# Patient Record
Sex: MALE | Race: WHITE | NOT HISPANIC OR LATINO | URBAN - METROPOLITAN AREA
[De-identification: names, ages, dates, MRNs, and addresses within clinical notes are randomized per-mention and may not be internally consistent; named-entity substitution may affect disease eponyms.]

---

## 2019-10-01 ENCOUNTER — OUTPATIENT (OUTPATIENT)
Dept: OUTPATIENT SERVICES | Facility: HOSPITAL | Age: 44
LOS: 1 days | End: 2019-10-01
Payer: MEDICAID

## 2019-10-01 PROCEDURE — G9001: CPT

## 2019-10-17 DIAGNOSIS — Z71.89 OTHER SPECIFIED COUNSELING: ICD-10-CM

## 2020-06-16 ENCOUNTER — EMERGENCY (EMERGENCY)
Facility: HOSPITAL | Age: 45
LOS: 1 days | End: 2020-06-16
Admitting: EMERGENCY MEDICINE
Payer: MEDICAID

## 2020-06-16 PROCEDURE — 99284 EMERGENCY DEPT VISIT MOD MDM: CPT

## 2020-06-16 PROCEDURE — 71045 X-RAY EXAM CHEST 1 VIEW: CPT | Mod: 26

## 2021-07-26 PROBLEM — Z00.00 ENCOUNTER FOR PREVENTIVE HEALTH EXAMINATION: Status: ACTIVE | Noted: 2021-07-26

## 2023-12-13 ENCOUNTER — INPATIENT (INPATIENT)
Facility: HOSPITAL | Age: 48
LOS: 5 days | Discharge: HOME | DRG: 881 | End: 2023-12-19
Attending: PSYCHIATRY & NEUROLOGY | Admitting: PSYCHIATRY & NEUROLOGY
Payer: MEDICAID

## 2023-12-13 RX ORDER — TRAZODONE HCL 50 MG
50 TABLET ORAL AT BEDTIME
Refills: 0 | Status: DISCONTINUED | OUTPATIENT
Start: 2023-12-13 | End: 2023-12-19

## 2023-12-13 RX ORDER — BUPROPION HYDROCHLORIDE 150 MG/1
150 TABLET, EXTENDED RELEASE ORAL DAILY
Refills: 0 | Status: DISCONTINUED | OUTPATIENT
Start: 2023-12-13 | End: 2023-12-19

## 2023-12-13 RX ORDER — INFLUENZA VIRUS VACCINE 15; 15; 15; 15 UG/.5ML; UG/.5ML; UG/.5ML; UG/.5ML
0.5 SUSPENSION INTRAMUSCULAR ONCE
Refills: 0 | Status: DISCONTINUED | OUTPATIENT
Start: 2023-12-13 | End: 2023-12-13

## 2023-12-13 RX ORDER — ALBUTEROL 90 UG/1
2 AEROSOL, METERED ORAL EVERY 6 HOURS
Refills: 0 | Status: DISCONTINUED | OUTPATIENT
Start: 2023-12-13 | End: 2023-12-19

## 2023-12-13 NOTE — BH PATIENT PROFILE - NSSBIRTDRGBRIEFINTDET_GEN_A_CORE
Using substances at the unhealthy identified increases the risk of substance abuse related health problems.

## 2023-12-13 NOTE — CHART NOTE - NSCHARTNOTEFT_GEN_A_CORE
Acceptance note    Pt arrived as transfer; writer met with patient and collected the following information    48 year old, past psychiatric history of depression & anxiety, denies prior history of inpatient psychiatric hospitalizations or suicide attempts, frequent crack/cocaine use, PMH of abdominal hernia & asthma, presenting as transfer form OSH for approximately one week of SI without plan & depressed mood in context of family- & holiday- related stressors & noncompliance with wellbutrin. Denies any somatic or physical complaints / discomfort currently. Denies any plan to harm self while in the unit, and states that he feels comfortable approaching staff if he develops dangerous thoughts. Denies past or present AVH; screens negative for past or present delusional content. Per RN staff patient has been calm, cooperative, and pleasant on 8UR. States that he was taking wellbutrin  mg po qday for 2-4 months, prescribed by staff at the shelter where he stays, and has been lost to follow-up noncompliant with wellbutrin for the past three days. States that wellbutrin was efficacious & denies any other psychotropic trials. Denies history of alcohol use or seizures. Denies taking any non-psychiatric medications besides intermittent & infrequent PRN albuterol. Pt has no further questions or concerns. Denies wanting something for sleep.     -Admission orders placed   -Wellbutrin  mg po qday   -no 1:1 required Acceptance note    Pt arrived as transfer; writer met with patient and collected the following information    48 year old, past psychiatric history of depression & anxiety, denies prior history of inpatient psychiatric hospitalizations or suicide attempts, frequent crack/cocaine use, PMH of abdominal hernia & asthma, presenting as transfer form OSH for approximately one week of SI without plan & depressed mood in context of family- & holiday- related stressors & noncompliance with wellbutrin. Denies any somatic or physical complaints / discomfort currently. Denies any plan to harm self while in the unit, and states that he feels comfortable approaching staff if he develops dangerous thoughts. Denies past or present AVH; screens negative for past or present delusional content. Per RN staff patient has been calm, cooperative, and pleasant on 8UR. States that he was taking wellbutrin  mg po qday for 2-4 months, prescribed by staff at the shelter where he stays, and has been lost to follow-up noncompliant with wellbutrin for the past three days. States that wellbutrin was efficacious & denies any other psychotropic trials. Denies history of alcohol use or seizures. Denies taking any non-psychiatric medications besides intermittent & infrequent PRN albuterol. Pt has no further questions or concerns. Denies wanting something for sleep.     -Admission orders placed   -Wellbutrin  mg po qday - primary team can alter if indicated   -Albuterol PRN   -Admission labs ordered   -no 1:1 required as pt has no high-risk history and has not exhibited any high-risk behavior

## 2023-12-14 VITALS
HEART RATE: 69 BPM | DIASTOLIC BLOOD PRESSURE: 71 MMHG | RESPIRATION RATE: 18 BRPM | TEMPERATURE: 99 F | OXYGEN SATURATION: 96 % | SYSTOLIC BLOOD PRESSURE: 113 MMHG

## 2023-12-14 DIAGNOSIS — F19.90 OTHER PSYCHOACTIVE SUBSTANCE USE, UNSPECIFIED, UNCOMPLICATED: ICD-10-CM

## 2023-12-14 DIAGNOSIS — F41.9 ANXIETY DISORDER, UNSPECIFIED: ICD-10-CM

## 2023-12-14 LAB
A1C WITH ESTIMATED AVERAGE GLUCOSE RESULT: 5.3 % — SIGNIFICANT CHANGE UP (ref 4–5.6)
A1C WITH ESTIMATED AVERAGE GLUCOSE RESULT: 5.3 % — SIGNIFICANT CHANGE UP (ref 4–5.6)
ALBUMIN SERPL ELPH-MCNC: 3.6 G/DL — SIGNIFICANT CHANGE UP (ref 3.3–5)
ALBUMIN SERPL ELPH-MCNC: 3.6 G/DL — SIGNIFICANT CHANGE UP (ref 3.3–5)
ALP SERPL-CCNC: 66 U/L — SIGNIFICANT CHANGE UP (ref 40–120)
ALP SERPL-CCNC: 66 U/L — SIGNIFICANT CHANGE UP (ref 40–120)
ALT FLD-CCNC: 9 U/L — LOW (ref 10–45)
ALT FLD-CCNC: 9 U/L — LOW (ref 10–45)
ANION GAP SERPL CALC-SCNC: 8 MMOL/L — SIGNIFICANT CHANGE UP (ref 5–17)
ANION GAP SERPL CALC-SCNC: 8 MMOL/L — SIGNIFICANT CHANGE UP (ref 5–17)
AST SERPL-CCNC: 12 U/L — SIGNIFICANT CHANGE UP (ref 10–40)
AST SERPL-CCNC: 12 U/L — SIGNIFICANT CHANGE UP (ref 10–40)
BILIRUB SERPL-MCNC: 0.3 MG/DL — SIGNIFICANT CHANGE UP (ref 0.2–1.2)
BILIRUB SERPL-MCNC: 0.3 MG/DL — SIGNIFICANT CHANGE UP (ref 0.2–1.2)
BUN SERPL-MCNC: 17 MG/DL — SIGNIFICANT CHANGE UP (ref 7–23)
BUN SERPL-MCNC: 17 MG/DL — SIGNIFICANT CHANGE UP (ref 7–23)
CALCIUM SERPL-MCNC: 9 MG/DL — SIGNIFICANT CHANGE UP (ref 8.4–10.5)
CALCIUM SERPL-MCNC: 9 MG/DL — SIGNIFICANT CHANGE UP (ref 8.4–10.5)
CHLORIDE SERPL-SCNC: 105 MMOL/L — SIGNIFICANT CHANGE UP (ref 96–108)
CHLORIDE SERPL-SCNC: 105 MMOL/L — SIGNIFICANT CHANGE UP (ref 96–108)
CHOLEST SERPL-MCNC: 122 MG/DL — SIGNIFICANT CHANGE UP
CHOLEST SERPL-MCNC: 122 MG/DL — SIGNIFICANT CHANGE UP
CO2 SERPL-SCNC: 24 MMOL/L — SIGNIFICANT CHANGE UP (ref 22–31)
CO2 SERPL-SCNC: 24 MMOL/L — SIGNIFICANT CHANGE UP (ref 22–31)
CREAT SERPL-MCNC: 0.7 MG/DL — SIGNIFICANT CHANGE UP (ref 0.5–1.3)
CREAT SERPL-MCNC: 0.7 MG/DL — SIGNIFICANT CHANGE UP (ref 0.5–1.3)
EGFR: 112 ML/MIN/1.73M2 — SIGNIFICANT CHANGE UP
EGFR: 112 ML/MIN/1.73M2 — SIGNIFICANT CHANGE UP
ESTIMATED AVERAGE GLUCOSE: 105 MG/DL — SIGNIFICANT CHANGE UP (ref 68–114)
ESTIMATED AVERAGE GLUCOSE: 105 MG/DL — SIGNIFICANT CHANGE UP (ref 68–114)
GLUCOSE SERPL-MCNC: 74 MG/DL — SIGNIFICANT CHANGE UP (ref 70–99)
GLUCOSE SERPL-MCNC: 74 MG/DL — SIGNIFICANT CHANGE UP (ref 70–99)
HCT VFR BLD CALC: 47.1 % — SIGNIFICANT CHANGE UP (ref 39–50)
HCT VFR BLD CALC: 47.1 % — SIGNIFICANT CHANGE UP (ref 39–50)
HDLC SERPL-MCNC: 38 MG/DL — LOW
HDLC SERPL-MCNC: 38 MG/DL — LOW
HGB BLD-MCNC: 15.7 G/DL — SIGNIFICANT CHANGE UP (ref 13–17)
HGB BLD-MCNC: 15.7 G/DL — SIGNIFICANT CHANGE UP (ref 13–17)
LIPID PNL WITH DIRECT LDL SERPL: 71 MG/DL — SIGNIFICANT CHANGE UP
LIPID PNL WITH DIRECT LDL SERPL: 71 MG/DL — SIGNIFICANT CHANGE UP
MCHC RBC-ENTMCNC: 31.5 PG — SIGNIFICANT CHANGE UP (ref 27–34)
MCHC RBC-ENTMCNC: 31.5 PG — SIGNIFICANT CHANGE UP (ref 27–34)
MCHC RBC-ENTMCNC: 33.3 GM/DL — SIGNIFICANT CHANGE UP (ref 32–36)
MCHC RBC-ENTMCNC: 33.3 GM/DL — SIGNIFICANT CHANGE UP (ref 32–36)
MCV RBC AUTO: 94.4 FL — SIGNIFICANT CHANGE UP (ref 80–100)
MCV RBC AUTO: 94.4 FL — SIGNIFICANT CHANGE UP (ref 80–100)
NON HDL CHOLESTEROL: 84 MG/DL — SIGNIFICANT CHANGE UP
NON HDL CHOLESTEROL: 84 MG/DL — SIGNIFICANT CHANGE UP
NRBC # BLD: 0 /100 WBCS — SIGNIFICANT CHANGE UP (ref 0–0)
NRBC # BLD: 0 /100 WBCS — SIGNIFICANT CHANGE UP (ref 0–0)
PLATELET # BLD AUTO: 278 K/UL — SIGNIFICANT CHANGE UP (ref 150–400)
PLATELET # BLD AUTO: 278 K/UL — SIGNIFICANT CHANGE UP (ref 150–400)
POTASSIUM SERPL-MCNC: 4.6 MMOL/L — SIGNIFICANT CHANGE UP (ref 3.5–5.3)
POTASSIUM SERPL-MCNC: 4.6 MMOL/L — SIGNIFICANT CHANGE UP (ref 3.5–5.3)
POTASSIUM SERPL-SCNC: 4.6 MMOL/L — SIGNIFICANT CHANGE UP (ref 3.5–5.3)
POTASSIUM SERPL-SCNC: 4.6 MMOL/L — SIGNIFICANT CHANGE UP (ref 3.5–5.3)
PROT SERPL-MCNC: 6.6 G/DL — SIGNIFICANT CHANGE UP (ref 6–8.3)
PROT SERPL-MCNC: 6.6 G/DL — SIGNIFICANT CHANGE UP (ref 6–8.3)
RBC # BLD: 4.99 M/UL — SIGNIFICANT CHANGE UP (ref 4.2–5.8)
RBC # BLD: 4.99 M/UL — SIGNIFICANT CHANGE UP (ref 4.2–5.8)
RBC # FLD: 12.4 % — SIGNIFICANT CHANGE UP (ref 10.3–14.5)
RBC # FLD: 12.4 % — SIGNIFICANT CHANGE UP (ref 10.3–14.5)
SODIUM SERPL-SCNC: 137 MMOL/L — SIGNIFICANT CHANGE UP (ref 135–145)
SODIUM SERPL-SCNC: 137 MMOL/L — SIGNIFICANT CHANGE UP (ref 135–145)
TRIGL SERPL-MCNC: 67 MG/DL — SIGNIFICANT CHANGE UP
TRIGL SERPL-MCNC: 67 MG/DL — SIGNIFICANT CHANGE UP
TSH SERPL-MCNC: 1.79 UIU/ML — SIGNIFICANT CHANGE UP (ref 0.27–4.2)
TSH SERPL-MCNC: 1.79 UIU/ML — SIGNIFICANT CHANGE UP (ref 0.27–4.2)
WBC # BLD: 10.68 K/UL — HIGH (ref 3.8–10.5)
WBC # BLD: 10.68 K/UL — HIGH (ref 3.8–10.5)
WBC # FLD AUTO: 10.68 K/UL — HIGH (ref 3.8–10.5)
WBC # FLD AUTO: 10.68 K/UL — HIGH (ref 3.8–10.5)

## 2023-12-14 PROCEDURE — 99223 1ST HOSP IP/OBS HIGH 75: CPT

## 2023-12-14 RX ORDER — NICOTINE POLACRILEX 2 MG
2 GUM BUCCAL
Refills: 0 | Status: DISCONTINUED | OUTPATIENT
Start: 2023-12-14 | End: 2023-12-19

## 2023-12-14 RX ORDER — NICOTINE POLACRILEX 2 MG
1 GUM BUCCAL DAILY
Refills: 0 | Status: DISCONTINUED | OUTPATIENT
Start: 2023-12-14 | End: 2023-12-19

## 2023-12-14 RX ADMIN — Medication 1 PATCH: at 18:00

## 2023-12-14 RX ADMIN — Medication 1 PATCH: at 12:45

## 2023-12-14 RX ADMIN — BUPROPION HYDROCHLORIDE 150 MILLIGRAM(S): 150 TABLET, EXTENDED RELEASE ORAL at 10:21

## 2023-12-14 NOTE — BH PSYCHOLOGY - CLINICIAN PSYCHOTHERAPY NOTE - NSBHPSYCHOLNARRATIVE_PSY_A_CORE FT
APPEARANCE:  [x] adequately groomed []disheveled  [] malodorous [] Other:   BEHAVIOR: xcooperative [] uncooperative [x] good EC [] poor EC x] well related [] oddly related [] guarded []PMA [] PMR []abnormal movements [] Other:   SPEED: [x] normal rate/rhythm/volume [] loud [] quiet [] slow  [] rapid [] pressured [] Other: _________   MOOD: [] euthymic [x] dysphoric []anxious [] irritable [] Other: ___________   AFFECT: [x] full [] expansive [] constricted [] blunted [] flat [] stable [] labile [] Other: _________________ THOUGHT PROCESS: [] organized [] disorganized [] goal-directed [] concrete [] logical  [] illogical   [] circumstantial [] tangential [] impoverished [] effusive [] repetitive [] Other: ___________   THOUGHT CONTENT: [x] negative for delusions/suicidal ideation /homicidal ideation  [] positive for delusions/suicidal ideation/homicidal ideation Describe:   PERCEPTION: [x] negative for auditory/ visual hallucinations  [] positive for auditory/ visual hallucinations Describe:    INSIGHT/JUDGMENT: [x] good []fair [] poor        IMPULSE CONTROL: [x] good []fair [] poor         COGNITION: [x] alert and oriented to person,time,place    Lacks orientation to person/ time/ place. Describe: _______________________    Met with Mr. Bradford for suicide assessment utilizing CAMS. He reported that he is feeling depressed and have been feeling increased depression with suicidal thoughts with some vague plan to jump in front of a bus or train, "something quick", sfor th3e last 5 days, since he did not refill his Wellbutrin, which he believes works well and maintains his mood when he is taking regularly. Mr. Bradford reported that the one time he had a suicide attempt, which he referred to as more of a cry for help was 15 yrs ago by taking pills, and he reported that since then he never attempted suicide, and always seeks help in a hospital, when he notices that his suicidal thoughts are escalating. He reported that he ahs been doing well, after getting discharged from [facility] for Substance use 3 months ago, and following the  with his sponsor, but more recently he relapsed on cocaine, and has been feeling more depressed, and looking for safety, as he wants to continue working on his substance use.   In terms of CAMS, he reported that primary  of suicide is self hate (rated 3/5) and about "I am 48 and I am in a psych spicer", 2nd  is hopelessness and 4/5 and about "getting back my life together, such as getting treatment for the hernia he has been dealing with for the last 5 years and also going to  meetigns", psychological pain is 4/5 and about "being away from my family", son, ex girlfriend, mom, brother sister. He elaborated that his 8yo son lives with his mother in [place], and when Mr. Bradford is not doing well, he is distant from his son, and his family is also keeping their distance from him when he is using cocaine. $th  is stress (4/5 ) and about "not being comfortable, not being with family and not working". 5th  is  agitation  rated 4/5 and about " if I were to keep on being homeless and be on stress, if I lost all connection to my family. He reported that his overall risk of suicide right now is 1/5. He does not feel suicidal, as soon as he started to have thoughts he came to the hospital, which he reported is something he does each time he starts to have suicidal thoughts, and has no intent to attempt suicide.   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 Meanwhile his only reason for dying in " When I fail these[ meaning the reasons for living], I get agitated, and I want to kill myself". He still reported that he is motivated for treatment.   Wish to live is 6/8, and wish to die is 4/8, reporting that sometimes he feels hopeless. One thing that would help him no longer feel suicidal is "having a safe environment to stay in right now" .   and this writer also discussed treatment plan, and he reported that cocaine use and relapse and safety through living in a safe place to be two problems to address,and for his, he needs to stay away from substances but continuing to go to NA, using his wellbutrin.   Mr. Bradford denies any suicidal ideation, is reflective and motivated for treatment, Low Acute Risk for suicide.  Suicide Assessment:   Static:  APPEARANCE:  [x] adequately groomed []disheveled  [] malodorous [] Other:   BEHAVIOR: xcooperative [] uncooperative [x] good EC [] poor EC x] well related [] oddly related [] guarded []PMA [] PMR []abnormal movements [] Other:   SPEED: [x] normal rate/rhythm/volume [] loud [] quiet [] slow  [] rapid [] pressured [] Other: _________   MOOD: [] euthymic [x] dysphoric []anxious [] irritable [] Other: ___________   AFFECT: [x] full [] expansive [] constricted [] blunted [] flat [] stable [] labile [] Other: _________________ THOUGHT PROCESS: [] organized [] disorganized [] goal-directed [] concrete [] logical  [] illogical   [] circumstantial [] tangential [] impoverished [] effusive [] repetitive [] Other: ___________   THOUGHT CONTENT: [x] negative for delusions/suicidal ideation /homicidal ideation  [] positive for delusions/suicidal ideation/homicidal ideation Describe:   PERCEPTION: [x] negative for auditory/ visual hallucinations  [] positive for auditory/ visual hallucinations Describe:    INSIGHT/JUDGMENT: [x] good []fair [] poor        IMPULSE CONTROL: [x] good []fair [] poor         COGNITION: [x] alert and oriented to person,time,place    Lacks orientation to person/ time/ place. Describe: _______________________    Met with Mr. Bradford for suicide assessment utilizing CAMS. He reported that he is feeling depressed and have been feeling increased depression with suicidal thoughts with some vague plan to jump in front of a bus or train, "something quick", sfor th3e last 5 days, since he did not refill his Wellbutrin, which he believes works well and maintains his mood when he is taking regularly. Mr. Bradford reported that the one time he had a suicide attempt, which he referred to as more of a cry for help was 15 yrs ago by taking pills, and he reported that since then he never attempted suicide, and always seeks help in a hospital, when he notices that his suicidal thoughts are escalating. He reported that he ahs been doing well, after getting discharged from [facility] for Substance use 3 months ago, and following the  with his sponsor, but more recently he relapsed on cocaine, and has been feeling more depressed, and looking for safety, as he wants to continue working on his substance use.   In terms of CAMS, he reported that primary  of suicide is self hate (rated 3/5) and about "I am 48 and I am in a psych spicer", 2nd  is hopelessness and 4/5 and about "getting back my life together, such as getting treatment for the hernia he has been dealing with for the last 5 years and also going to  meetigns", psychological pain is 4/5 and about "being away from my family", son, ex girlfriend, mom, brother sister. He elaborated that his 6yo son lives with his mother in [place], and when Mr. Bradford is not doing well, he is distant from his son, and his family is also keeping their distance from him when he is using cocaine. $th  is stress (4/5 ) and about "not being comfortable, not being with family and not working". 5th  is  agitation  rated 4/5 and about " if I were to keep on being homeless and be on stress, if I lost all connection to my family. He reported that his overall risk of suicide right now is 1/5. He does not feel suicidal, as soon as he started to have thoughts he came to the hospital, which he reported is something he does each time he starts to have suicidal thoughts, and has no intent to attempt suicide.   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 Meanwhile his only reason for dying in " When I fail these[ meaning the reasons for living], I get agitated, and I want to kill myself". He still reported that he is motivated for treatment.   Wish to live is 6/8, and wish to die is 4/8, reporting that sometimes he feels hopeless. One thing that would help him no longer feel suicidal is "having a safe environment to stay in right now" .   and this writer also discussed treatment plan, and he reported that cocaine use and relapse and safety through living in a safe place to be two problems to address,and for his, he needs to stay away from substances but continuing to go to NA, using his wellbutrin.   Mr. Bradford denies any suicidal ideation, is reflective and motivated for treatment, Low Acute Risk for suicide.  Suicide Assessment:   Static:

## 2023-12-14 NOTE — BH INPATIENT PSYCHIATRY ASSESSMENT NOTE - NSREFERRALDIRECTTXFER_PSY_ALL_CORE
Outside Hospital ([facility] Outside Hospital ([facility]

## 2023-12-14 NOTE — BH INPATIENT PSYCHIATRY ASSESSMENT NOTE - CURRENT MEDICATION
MEDICATIONS  (STANDING):  buPROPion XL (24-Hour) 150 milliGRAM(s) Oral daily  nicotine - 21 mG/24Hr(s) Patch 1 Patch Transdermal daily    MEDICATIONS  (PRN):  albuterol    90 MICROgram(s) HFA Inhaler 2 Puff(s) Inhalation every 6 hours PRN Shortness of Breath  traZODone 50 milliGRAM(s) Oral at bedtime PRN insomnia   MEDICATIONS  (STANDING):  buPROPion XL (24-Hour) 150 milliGRAM(s) Oral daily  nicotine - 21 mG/24Hr(s) Patch 1 Patch Transdermal daily    MEDICATIONS  (PRN):  albuterol    90 MICROgram(s) HFA Inhaler 2 Puff(s) Inhalation every 6 hours PRN Shortness of Breath  nicotine  Polacrilex Gum 2 milliGRAM(s) Oral every 2 hours PRN NRT  traZODone 50 milliGRAM(s) Oral at bedtime PRN insomnia   MEDICATIONS  (STANDING):  buPROPion XL (24-Hour) 150 milliGRAM(s) Oral daily  nicotine - 21 mG/24Hr(s) Patch 1 Patch Transdermal daily    MEDICATIONS  (PRN):  albuterol    90 MICROgram(s) HFA Inhaler 2 Puff(s) Inhalation every 6 hours PRN Shortness of Breath  guaiFENesin Oral Liquid (Sugar-Free) 200 milliGRAM(s) Oral every 6 hours PRN cough  nicotine  Polacrilex Gum 2 milliGRAM(s) Oral every 2 hours PRN NRT  traZODone 50 milliGRAM(s) Oral at bedtime PRN insomnia

## 2023-12-14 NOTE — BH INPATIENT PSYCHIATRY ASSESSMENT NOTE - NSBHATTESTAPPBILLTIME_PSY_A_CORE
I attest my time as RENNY is greater than 50% of the total combined time spent on qualifying patient care activities. I have reviewed and verified the documentation.

## 2023-12-14 NOTE — BH INPATIENT PSYCHIATRY ASSESSMENT NOTE - RISK ASSESSMENT
Static: Hx SI, Hx incarceration, hx substance abuse  Modifiable: Ran out of medications, more attainable psychiatric follow up appointments, crack cocaine use  Protective: Son, Buddhism Static: Hx SI, Hx incarceration, hx substance abuse  Modifiable: Ran out of medications, more attainable psychiatric follow up appointments, crack cocaine use  Protective: Son, Hoahaoism Static: Hx SI, Hx incarceration, hx substance abuse  Modifiable: Ran out of medications, more attainable psychiatric follow up appointments, crack cocaine use  Protective: Pt has a son, Caodaism Static: Hx SI, Hx incarceration, hx substance abuse  Modifiable: Ran out of medications, more attainable psychiatric follow up appointments, crack cocaine use  Protective: Pt has a son, [employer]

## 2023-12-14 NOTE — BH INPATIENT PSYCHIATRY ASSESSMENT NOTE - NSSUICPROTFACT_PSY_ALL_CORE
Responsibility to children, family, or others/Identifies reasons for living/Congregation beliefs Responsibility to children, family, or others/Identifies reasons for living/Mormon beliefs

## 2023-12-14 NOTE — BH INPATIENT PSYCHIATRY ASSESSMENT NOTE - NSBHMETABOLIC_PSY_ALL_CORE_FT
BMI:   HbA1c:   Glucose:   BP: 113/71 (12-14-23 @ 10:02) (113/71 - 113/71)Vital Signs Last 24 Hrs  T(C): 37.2 (12-14-23 @ 10:02), Max: 37.2 (12-14-23 @ 10:02)  T(F): 98.9 (12-14-23 @ 10:02), Max: 98.9 (12-14-23 @ 10:02)  HR: 69 (12-14-23 @ 10:02) (69 - 69)  BP: 113/71 (12-14-23 @ 10:02) (113/71 - 113/71)  BP(mean): --  RR: 18 (12-14-23 @ 10:02) (18 - 18)  SpO2: 96% (12-14-23 @ 10:02) (96% - 96%)      Lipid Panel:  BMI:   HbA1c: A1C with Estimated Average Glucose Result: 5.3 % (12-14-23 @ 16:28)    Glucose:   BP: 112/77 (12-15-23 @ 09:08) (108/73 - 113/71)Vital Signs Last 24 Hrs  T(C): 36.4 (12-15-23 @ 09:08), Max: 37.3 (12-14-23 @ 16:33)  T(F): 97.6 (12-15-23 @ 09:08), Max: 99.1 (12-14-23 @ 16:33)  HR: 67 (12-15-23 @ 09:08) (67 - 68)  BP: 112/77 (12-15-23 @ 09:08) (108/73 - 112/77)  BP(mean): --  RR: 18 (12-15-23 @ 09:08) (18 - 18)  SpO2: 95% (12-15-23 @ 09:08) (95% - 96%)      Lipid Panel: Date/Time: 12-14-23 @ 16:28  Cholesterol, Serum: 122  LDL Cholesterol Calculated: 71  HDL Cholesterol, Serum: 38  Total Cholesterol/HDL Ration Measurement: --  Triglycerides, Serum: 67

## 2023-12-14 NOTE — BH INPATIENT PSYCHIATRY ASSESSMENT NOTE - NSBHPHYSICALEXAM_PSY_ALL_CORE
Chaperoned by Nurse Sebastien     General: Well appearing, in no acute distress, grooming and hygiene fair.   Cardio: S1, S2, Normal rate and rhythm, no murmurs appreciated  Pulm: Breath sounds vesicular, no wheezing, rhonchi, rales   Abd: Bulge appreciated on R side, above the umbilicus. Bowel sounds appreciated, normal. No strangulation or incarceration noted.\  Neuro: A&O x3, thought and speech coherent. CN 2-12 intact

## 2023-12-14 NOTE — BH INPATIENT PSYCHIATRY ASSESSMENT NOTE - NSTXPROBTOBACO_PSY_ALL_CORE
Problem: Breathing Pattern Ineffective  Goal: Air exchange is effective, demonstrated by Sp02 sat of greater then or = 92% (or as ordered)  Outcome: Outcome Met, Continue evaluating goal progress toward completion  Goal: Respiratory pattern is quiet and regular without report of SOB  Outcome: Outcome Not Met, Continue to Monitor  Flowsheets (Taken 3/15/2021 2217)  Respiratory Pattern-Subjective: Verbalizes SOB only with activity     Problem: Activity Intolerance  Goal: # Functional status is maintained or returned to baseline  Outcome: Outcome Not Met, Continue to Monitor     Problem: At Risk for Falls  Goal: # Patient does not fall  Outcome: Outcome Met, Continue evaluating goal progress toward completion  Goal: # Takes action to control fall-related risks  Outcome: Outcome Met, Continue evaluating goal progress toward completion      TOBACCO/NICOTINE USE

## 2023-12-14 NOTE — BH SOCIAL WORK INITIAL PSYCHOSOCIAL EVALUATION - NSBHDISABILITY_PSY_ALL_CORE
[2 - 3 times a week (3 pts)] : 2 - 3  times a week (3 points) [3 or 4 (1 pt)] : 3 or 4  (1 point) [Never (0 pts)] : Never (0 points) [Yes] : In the past 12 months have you used drugs other than those required for medical reasons? Yes [No falls in past year] : Patient reported no falls in the past year [0] : 2) Feeling down, depressed, or hopeless: Not at all (0) [Patient reported mammogram was normal] : Patient reported mammogram was normal [Patient reported PAP Smear was abnormal] : Patient reported PAP Smear was abnormal [Patient reported colonoscopy was abnormal] : Patient reported colonoscopy was abnormal [] : No [Audit-CScore] : 4 [de-identified] : THC [de-identified] : Denies [[id number]] : 0 [MammogramDate] : 02/19 [PapSmearDate] : 01/19 [PapSmearComments] : HPV [id number] [ColonoscopyDate] : 12/18 [ColonoscopyComments] : colon polyp  rpt 3 years Dr. Allen  Yes...

## 2023-12-14 NOTE — BH INPATIENT PSYCHIATRY ASSESSMENT NOTE - OTHER PAST PSYCHIATRIC HISTORY (INCLUDE DETAILS REGARDING ONSET, COURSE OF ILLNESS, INPATIENT/OUTPATIENT TREATMENT)
Hx depression, anxiety, SI. 2 Prior hospitalizations at [facility]. Takes Wellbutrin 150mg Hx depression, anxiety, SI. 2 Prior hospitalizations at [facility]. Takes Wellbutrin 150mg

## 2023-12-14 NOTE — BH SOCIAL WORK INITIAL PSYCHOSOCIAL EVALUATION - OTHER PAST PSYCHIATRIC HISTORY (INCLUDE DETAILS REGARDING ONSET, COURSE OF ILLNESS, INPATIENT/OUTPATIENT TREATMENT)
PPHx Depression, Anxiety  PMHx Abdominal Hernia, Asthma  Multiple prior psychiatric hospitalizations (5+ in the past 5 years, with 1 remote state psychiatric hospitalization for SA)  Denies hx NSSIB  Endorses hx SA (x1 via OD roughly 15 years ago)  Denies HI, PI, AVH  Endorses current SI (plan and intent to step into traffic to "end things quickly")    Pt is a 47yo male,  from wife with one son, unemployed on disability, domiciled in homeless shelter in [place], non-caregiver. Pt initially presented to [facility] ED, BIBS a/b self c/o suicidal thoughts in the context of family and holiday-related stressors as well as medication non-adherence. Pt was assessed and transferred to [facility] inpatient psychiatry for further stabilization. Pt has a history of inpatient psychiatric treatment on [place] for depression related treatment; pt has also recently completed 3 month inpatient substance use treatment for his chronic cocaine use. Pt has been experiencing psychosocial stressors, leading to non-adherence to Wellbutrin prescription which subsequently led to psychiatric decompensation. Pt would benefit from case management, outpatient psychiatric medication management, individual psychotherapy, and peer support. PPHx Depression, Anxiety  PMHx Abdominal Hernia, Asthma  Multiple prior psychiatric hospitalizations (5+ in the past 5 years, with 1 remote state psychiatric hospitalization for SA)  Denies hx NSSIB  Endorses hx SA (x1 via OD roughly 15 years ago)  Denies HI, PI, AVH  Endorses current SI (plan and intent to step into traffic to "end things quickly")    Pt is a 49yo male,  from wife with one son, unemployed on disability, domiciled in homeless shelter in [place], non-caregiver. Pt initially presented to [facility] ED, BIBS a/b self c/o suicidal thoughts in the context of family and holiday-related stressors as well as medication non-adherence. Pt was assessed and transferred to [facility] inpatient psychiatry for further stabilization. Pt has a history of inpatient psychiatric treatment on [place] for depression related treatment; pt has also recently completed 3 month inpatient substance use treatment for his chronic cocaine use. Pt has been experiencing psychosocial stressors, leading to non-adherence to Wellbutrin prescription which subsequently led to psychiatric decompensation. Pt would benefit from case management, outpatient psychiatric medication management, individual psychotherapy, and peer support. PPHx Depression, Anxiety, Substance Use Disorder  PMHx Abdominal Hernia, Asthma  Multiple prior psychiatric hospitalizations (5+ in the past 5 years, with 1 remote state psychiatric hospitalization for SA)  Denies hx NSSIB  Endorses hx SA (x1 via OD roughly 15 years ago)  Denies HI, PI, AVH  Endorses current SI (plan and intent to step into traffic to "end things quickly")    Pt is a 47yo White male,  from wife with one son, unemployed on disability, domiciled in homeless shelter in [place], non-caregiver. Pt initially presented to [facility] ED, BIBS a/b self c/o suicidal thoughts in the context of family and holiday-related stressors as well as medication non-adherence. Pt was assessed and transferred to [facility] inpatient psychiatry for further stabilization. Pt has a history of inpatient psychiatric treatment on [place] [facility] for depression related treatment; pt has also recently completed 3 month inpatient substance use treatment for his chronic cocaine use. Pt has been experiencing psychosocial stressors, leading to non-adherence to Wellbutrin prescription which subsequently led to psychiatric decompensation. Pt would benefit from case management, outpatient psychiatric medication management, individual psychotherapy, and peer support. PPHx Depression, Anxiety, Substance Use Disorder  PMHx Abdominal Hernia, Asthma  Multiple prior psychiatric hospitalizations (5+ in the past 5 years, with 1 remote state psychiatric hospitalization for SA)  Denies hx NSSIB  Endorses hx SA (x1 via OD roughly 15 years ago)  Denies HI, PI, AVH  Endorses current SI (plan and intent to step into traffic to "end things quickly")    Pt is a 47yo White male,  from wife with one son, unemployed on disability, domiciled in homeless shelter in [place], non-caregiver. Pt initially presented to [facility] ED, BIBS a/b self c/o suicidal thoughts in the context of family and holiday-related stressors as well as medication non-adherence. Pt was assessed and transferred to [facility] inpatient psychiatry for further stabilization. Pt has a history of inpatient psychiatric treatment on [place] for depression related treatment; pt has also recently completed 3 month inpatient substance use treatment for his chronic cocaine use. Pt has been experiencing psychosocial stressors, leading to non-adherence to Wellbutrin prescription which subsequently led to psychiatric decompensation. Pt would benefit from case management, outpatient psychiatric medication management, individual psychotherapy, and peer support.

## 2023-12-14 NOTE — BH SOCIAL WORK INITIAL PSYCHOSOCIAL EVALUATION - NSBHCOMMCURRENT_PSY_ALL_CORE
[FreeTextEntry1] : 62 year-old obese (BMI 32) man from [place] with history of dyslipidemia,  and excessive drinking is being seen for chronic hepatitis B and fatty liver. He has been following with Dr. Benitez, last seen in 2019, lost follow up and transferred care in 6/2021. \par \par He used to drink alcohol excessively for many years and has been drinking  3-4 beers daily on weekends, and 6-7 beers per day on weekends for years. He reportedly quit  in 2019. Patient has fatty liver likely due to the combination of metabolic risk factors and alcohol. \par Patient has chronic hepatitis B, eAb positive, with low HBV viremia of 1315 (11/2020), normal PLT, and no focal liver mass on abdominal US (3/2021), treatment naive. \par He had FibroScan in 2019 showing S1 (242 dB/m) steatosis and no fibrosis (F0-F1 5.8kPA). \par \par Chronic Hepatitis B, eAb pos, treatment naive\par - I have explained to him the natural history of chronic hepatitis B.\par - Recent LFTs, ALT WNL, Hep B DNA low (< 2000 IU/ml), and no fibrosis on Fibroscan (7/6/21 Fo, S1), thus did not meet criteria for treatment\par - Will get LFTs and Hep B DNA today\par - Will get repeat FibroScan in 1 year (7/2022)\par - C/w q 6 months HCC surveillance, will get AFP (was WNL in 6/2021), last US abd 3/2021 showing hepatic steatosis, no focal mass, next overdue, reordered\par - Patient has been counseled on prevention of HBV transmission, including but not limited to: not sharing toothbrushes, razors, injection equipment, glucose testing equipment, covering open cuts and scratches, using barrier protection (if sexual partner not immune), testing household and sexual contacts and vaccinating if not immune, not donating blood, organs or sperm. \par \par Hepatic steatosis, likely due to metabolic risk factors and alcohol Hx\par - FibroScan S1 steatosis, no fibrosis (7/2021)\par - US abd showing steatosis, LFTs WNL\par - Patient has been counseled on life style interventions, including but not limited to: weight loss (3-5% loss of body weight might improve fat in the liver, while 7-10% needed for potential improvement of other components, including inflammation and scarring of the liver, called fibrosis); healthy diet, avoiding added sugars, sodas, avoiding saturated fats, limiting sodium, avoiding alcohol; and on the importance of regular exercise (> 150 min/week moderate intensity aerobic exercise with at least 2x/week muscle strengthening or exercise as tolerated). \par - Still reports 3-4x / week alcohol, strongly advised on strict, complete alcohol abstinency, defers substance abuse team\par \par HCM\par - Hep A immune (2018)\par - HCV ab neg (2019)\par - HIV neg (2015)\par - COVID vaccine - not yet, f/u w PCP\par - colonoscopy recently, following w Dr. Cramer, reports hemorrhoids\par \par \par RTC 6 months but patient to do blood work and US abd and call office to discuss results None

## 2023-12-14 NOTE — BH INPATIENT PSYCHIATRY ASSESSMENT NOTE - NSBHASSESSSUMMFT_PSY_ALL_CORE
Pt is a 49yo M, single, domiciled at [facility] with a pphx of depression, SI, anxiety, for which he takes Wellbutrin 150mg. PMHx hernia. Prior psychiatric hospitalizations at [facility] in July 2023 and 2021. Pt is a smoker 1ppd x 30 years, endorses frequent crack cocaine use. Prior incarcerations in '99, '03, and from 1635-0354.   Denies any pending legal issues. Denies FH of psychiatric disorders, Denies alcohol use.   Pt presents with SI and depression, appears hopeless, but identifies reasons for living. Wants to "get his life on track" while he is here.    Dx: Depression   Ddx: Anxiety, Substance use d/o    Start pt on Wellbutrin 150mg qd.  NRT- Patch and Gum   Pt will attend groups as well as consider 1:1 therapy with psychology team  Consider surgical consult for Hernia    Pt is a 49yo M, single, domiciled at [facility] with a pphx of depression, SI, anxiety, for which he takes Wellbutrin 150mg. PMHx hernia. Prior psychiatric hospitalizations at [facility] [place] and 2021. Pt is a smoker 1ppd x 30 years, endorses frequent crack cocaine use. Prior incarcerations in '99, '03, and from 4056-0189.   Denies any pending legal issues. Denies FH of psychiatric disorders, Denies alcohol use.   Pt presents with SI and depression, appears hopeless, but identifies reasons for living. Wants to "get his life on track" while he is here.    Dx: Depression   Ddx: Anxiety, Substance use d/o    Start pt on Wellbutrin 150mg qd.  NRT- Patch and Gum   Pt will attend groups as well as consider 1:1 therapy with psychology team  Consider surgical consult for Hernia    Pt is a 47yo M, single, domiciled at [facility] with a pphx of depression, SI, anxiety, for which he takes Wellbutrin 150mg. PMHx hernia. [place] [facility] [place]. Pt is a smoker 1ppd x 30 years, endorses frequent crack cocaine use. Prior incarcerations in '99, '03, and from 6528-4415.   Denies any pending legal issues. Denies FH of psychiatric disorders, Denies alcohol use.   Pt presents with SI and depression, appears hopeless, but identifies reasons for living. Wants to "get his life on track" while he is here.    Dx: Depression   Ddx: Anxiety, Substance use d/o    Start pt on Wellbutrin 150mg qd. Consider augmenting with antipsychotic if sx do not improve  NRT- Patch and Gum   Pt will attend groups as well as consider 1:1 therapy with psychology team  Consider surgical consult for Hernia  Pt is a 49yo M, single, domiciled at [facility] with a pphx of depression, SI, anxiety, for which he takes Wellbutrin 150mg. PMHx hernia. Prior psychiatric hospitalizations at [facility] in July 2023 and 2021. Pt is a smoker 1ppd x 30 years, endorses frequent crack cocaine use. Prior incarcerations in '99, '03, and from 6934-8480.   Denies any pending legal issues. Denies FH of psychiatric disorders, Denies alcohol use.   Pt presents with SI and depression, appears hopeless, but identifies reasons for living. Wants to "get his life on track" while he is here.    Dx: Depression   Ddx: Anxiety, Substance use d/o    Start pt on Wellbutrin 150mg qd. Consider augmenting with antipsychotic if sx do not improve  NRT- Patch and Gum   Pt will attend groups as well as consider 1:1 therapy with psychology team  Consider surgical consult for Hernia  This is a 47yo, single, domiciled at [facility]  male with medical hx of hernia. Psychiatric history significant for depression, anxiety. [place] [facility] [place]23. No hx of suicide attempts or NSSIB. Pt is a smoker 1ppd x 30 years, endorses frequent crack cocaine use. Prior incarcerations in '99, '03, and from 0403-0137. Pt has a 6yo son who lives with his mother in [place].  Denies any pending legal issues. Denies FH of psychiatric disorders, Denies alcohol use. Patient self presents to ED with complaint of SI in context of medication non-compliance and recent cocaine use.       Dx: Depression   Ddx: Anxiety, Substance use d/o    Start pt on Wellbutrin 150mg qd  NRT- Patch and Gum   Pt will attend groups as well as consider 1:1 therapy with psychology team   This is a 47yo, single, domiciled at [facility]  male with medical hx of hernia. Psychiatric history significant for depression, anxiety. Prior psychiatric hospitalizations at [facility] in 2021 and most recently in July 2023. No hx of suicide attempts or NSSIB. Pt is a smoker 1ppd x 30 years, endorses frequent crack cocaine use. Prior incarcerations in '99, '03, and from 0096-8207. Pt has a 8yo son who lives with his mother in [place].  Denies any pending legal issues. Denies FH of psychiatric disorders, Denies alcohol use. Patient self presents to ED with complaint of SI in context of medication non-compliance and recent cocaine use.       Dx: Depression   Ddx: Anxiety, Substance use d/o    Start pt on Wellbutrin 150mg qd  NRT- Patch and Gum   Pt will attend groups as well as consider 1:1 therapy with psychology team

## 2023-12-14 NOTE — BH INPATIENT PSYCHIATRY ASSESSMENT NOTE - DESCRIPTION
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

## 2023-12-14 NOTE — BH INPATIENT PSYCHIATRY ASSESSMENT NOTE - NSBHCHARTREVIEWVS_PSY_A_CORE FT
Vital Signs Last 24 Hrs  T(C): 37.2 (12-14-23 @ 10:02), Max: 37.2 (12-14-23 @ 10:02)  T(F): 98.9 (12-14-23 @ 10:02), Max: 98.9 (12-14-23 @ 10:02)  HR: 69 (12-14-23 @ 10:02) (69 - 69)  BP: 113/71 (12-14-23 @ 10:02) (113/71 - 113/71)  BP(mean): --  RR: 18 (12-14-23 @ 10:02) (18 - 18)  SpO2: 96% (12-14-23 @ 10:02) (96% - 96%)     Vital Signs Last 24 Hrs  T(C): 36.4 (12-15-23 @ 09:08), Max: 37.3 (12-14-23 @ 16:33)  T(F): 97.6 (12-15-23 @ 09:08), Max: 99.1 (12-14-23 @ 16:33)  HR: 67 (12-15-23 @ 09:08) (67 - 68)  BP: 112/77 (12-15-23 @ 09:08) (108/73 - 112/77)  BP(mean): --  RR: 18 (12-15-23 @ 09:08) (18 - 18)  SpO2: 95% (12-15-23 @ 09:08) (95% - 96%)

## 2023-12-14 NOTE — BH INPATIENT PSYCHIATRY ASSESSMENT NOTE - NSBHMETABOLICLABS_PSY_ALL_CORE
To Dr. Foley:  Have you received any pre op information for patient?  Patient would like to have labs done before is H&P.    Labs within last 12 months

## 2023-12-14 NOTE — BH INPATIENT PSYCHIATRY ASSESSMENT NOTE - HPI (INCLUDE ILLNESS QUALITY, SEVERITY, DURATION, TIMING, CONTEXT, MODIFYING FACTORS, ASSOCIATED SIGNS AND SYMPTOMS)
Pt is a 49yo M, single, domiciled at [facility] with a pphx of depression, SI, anxiety, for which he takes Wellbutrin 150mg. PMHx hernia. Prior psychiatric hospitalizations at [facility] [place] and 2021. Pt is a smoker 1ppd x 30 years, endorses frequent crack cocaine use. Prior incarcerations in '99, '03, and from 0594-5000. Pt has a 6yo son who lives with his mother in [place].  Denies any pending legal issues. Denies FH of psychiatric disorders, Denies alcohol use, NSSIB.      As per ED Note: "Pt is a 49yo M, single, has 8 yo son who lives with his mother and is not n pt's custody, resides at the [facility] shelter in [facility], unemployed, PMH significant for a ventral hernia and asthma, with psych h/o unspecified mood disorder (MDD vs substance-induced depressive disorder vs DMDD vs Bipolar I) and multiple substance related disorders (ccn, MJ, etoh, tobacco, sedatives, opioids), previous inpt rehab stays, prior psych admissions (last at [facility] 7/14-7/27/23), denies pSA/NSSIB, denies h/o aggression, +active crack/ccn use (reports having been incarcerated twice for theft related charges),followed by a psychiatrist at his shelter, on Wellbutrin 150mg, who self presents to the ED reporting depressive symptoms for the last 2 days."    Today Pt seen in his room laying in bed with ELENA Roblero. States that he is here for SI, and that he has not had is Wellbutrin in 5-6 days because he ran out and has not been able to see his psychiatrist to fill it. Pt would like to restart his Wellbutrin as it "keeps him level". He stated that he was on Lexipro once, but was too afraid of the sexual SE, so he stopped it. Pt states that the SI come and go, especially around the holidays because he is away from his son, but he has no plan and has never acted on his SI. States that his son is a reason to stay alive. Endorsed decreased appetite and wt loss leading up to his hospitalization, but feels as though it is coming back. Prior to this hospitalization, pt was going to the [facility] 4 days/week for 1:1 as well as group therapy, but states that it was too much and could not do that and hold a job. States that he would prefer therapy 2days/wk rather than 4. Pt is unsure where he will go after is 12mo are up at [employer] [facility]. He wants to try to get his life on track.   Pt states that he has a hernia that he was supposed to get repaired in the coming weeks. States that he has pre-op appts with cardiology and pulmonology.  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   Denies YADIRA HERRERA     Pt is a 47yo M, single, domiciled at [facility] with a pphx of depression, SI, anxiety, for which he takes Wellbutrin 150mg. PMHx hernia. [place] [facility] [place]. Pt is a smoker 1ppd x 30 years, endorses frequent crack cocaine use. Prior incarcerations in '99, '03, and from 1056-7830. Pt has a 8yo son who lives with his mother in [place].  Denies any pending legal issues. Denies FH of psychiatric disorders, Denies alcohol use, NSSIB.      As per ED Note: "Pt is a 47yo M, single, has 8 yo son who lives with his mother and is not n pt's custody, resides at the [facility] shelter in [facility], unemployed, PMH significant for a ventral hernia and asthma, with psych h/o unspecified mood disorder (MDD vs substance-induced depressive disorder vs DMDD vs Bipolar I) and multiple substance related disorders (ccn, MJ, etoh, tobacco, sedatives, opioids), previous inpt rehab stays, prior psych admissions (last at [facility] 7/14-7/27/23), denies pSA/NSSIB, denies h/o aggression, +active crack/ccn use (reports having been incarcerated twice for theft related charges),followed by a psychiatrist at his shelter, on Wellbutrin 150mg, who self presents to the ED reporting depressive symptoms for the last 2 days."    Today Pt seen in his room laying in bed with ELENA Roblero. States that he is here for SI, and that he has not had is Wellbutrin in 5-6 days because he ran out and has not been able to see his psychiatrist to fill it. Pt would like to restart his Wellbutrin as it "keeps him level". He stated that he was on Lexipro once, but was too afraid of the sexual SE, so he stopped it. Pt states that the SI come and go, especially around the holidays because he is away from his son, but he has no plan and has never acted on his SI. States that his son is a reason to stay alive. Endorsed decreased appetite and wt loss leading up to his hospitalization, but feels as though it is coming back. Prior to this hospitalization, pt was going to the [facility] 4 days/week for 1:1 as well as group therapy, but states that it was too much and could not do that and hold a job. States that he would prefer therapy 2days/wk rather than 4. Pt is unsure where he will go after is 12mo are up at [facility]. He wants to try to get his life on track.   Pt states that he has a hernia that he was supposed to get repaired in the coming weeks. States that he has pre-op appts with cardiology and pulmonology.  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   Denies YADIRA HERRERA     This is a 49yo, single, domiciled at [facility]  male with medical hx of hernia. Psychiatric history significant for depression, anxiety. Prior [place] [facility] [place] and most recently in July 2023. No hx of suicide attempts or NSSIB. Pt is a smoker 1ppd x 30 years, endorses frequent crack cocaine use. Prior incarcerations in '99, '03, and from 8121-4454. Pt has a 6yo son who lives with his mother in [place].  Denies any pending legal issues. Denies FH of psychiatric disorders, Denies alcohol use.    As per ED Note: "Pt is a 49yo M, single, has 6 yo son who lives with his mother and is not n pt's custody, resides at the [facility] shelter in [facility], unemployed, PMH significant for a ventral hernia and asthma, with psych h/o unspecified mood disorder (MDD vs substance-induced depressive disorder vs DMDD vs Bipolar I) and multiple substance related disorders (ccn, MJ, etoh, tobacco, sedatives, opioids), previous inpt rehab stays, [facility]), denies pSA/NSSIB, denies h/o aggression, +active crack/ccn use (reports having been incarcerated twice for theft related charges),followed by a psychiatrist at his shelter, on Wellbutrin 150mg, who self presents to the ED reporting depressive symptoms for the last 2 days."    Today Pt seen in his room laying in bed with ELENA Roblero. States that he is here for SI, and that he has not had is Wellbutrin in 5-6 days because he ran out and has not been able to see his psychiatrist to fill it. Pt would like to restart his Wellbutrin as it "keeps him level". He stated that he was on Lexipro once, but was too afraid of the sexual SE, so he stopped it. Pt states that the SI come and go, especially around the holidays because he is away from his son, but he has no plan and has never acted on his SI. States that his son is a reason to stay alive. Endorsed decreased appetite and wt loss leading up to his hospitalization, but feels as though it is coming back. Prior to this hospitalization, pt was going to the [facility] 4 days/week for 1:1 as well as group therapy, but states that it was too much and could not do that and hold a job. States that he would prefer therapy 2days/wk rather than 4. Pt is unsure where he will go after is 12mo are up at [facility]. He wants to try to get his life on track.   Pt states that he has a hernia that he was supposed to get repaired in the coming weeks. States that he has pre-op appts with cardiology and pulmonology.  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   Denies YADIRA HERRERA     This is a 49yo, single, domiciled at [facility]  male with medical hx of hernia. Psychiatric history significant for depression, anxiety. Prior psychiatric hospitalizations at [facility] in 2021 and most recently in July 2023. No hx of suicide attempts or NSSIB. Pt is a smoker 1ppd x 30 years, endorses frequent crack cocaine use. Prior incarcerations in '99, '03, and from 8819-0468. Pt has a 8yo son who lives with his mother in [place].  Denies any pending legal issues. Denies FH of psychiatric disorders, Denies alcohol use.    As per ED Note: "Pt is a 49yo M, single, has 8 yo son who lives with his mother and is not n pt's custody, resides at the [facility] shelter in [facility], unemployed, PMH significant for a ventral hernia and asthma, with psych h/o unspecified mood disorder (MDD vs substance-induced depressive disorder vs DMDD vs Bipolar I) and multiple substance related disorders (ccn, MJ, etoh, tobacco, sedatives, opioids), previous inpt rehab stays, prior psych admissions (last at [facility] 7/14-7/27/23), denies pSA/NSSIB, denies h/o aggression, +active crack/ccn use (reports having been incarcerated twice for theft related charges),followed by a psychiatrist at his shelter, on Wellbutrin 150mg, who self presents to the ED reporting depressive symptoms for the last 2 days."    Today Pt seen in his room laying in bed with ELENA Roblero. States that he is here for SI, and that he has not had is Wellbutrin in 5-6 days because he ran out and has not been able to see his psychiatrist to fill it. Pt would like to restart his Wellbutrin as it "keeps him level". He stated that he was on Lexipro once, but was too afraid of the sexual SE, so he stopped it. Pt states that the SI come and go, especially around the holidays because he is away from his son, but he has no plan and has never acted on his SI. States that his son is a reason to stay alive. Endorsed decreased appetite and wt loss leading up to his hospitalization, but feels as though it is coming back. Prior to this hospitalization, pt was going to the [facility] 4 days/week for 1:1 as well as group therapy, but states that it was too much and could not do that and hold a job. States that he would prefer therapy 2days/wk rather than 4. Pt is unsure where he will go after is 12mo are up at [facility]. He wants to try to get his life on track.   Pt states that he has a hernia that he was supposed to get repaired in the coming weeks. States that he has pre-op appts with cardiology and pulmonology.  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   Denies YADIRA HERRERA     This is a 49yo, single, domiciled at [facility]  male with medical hx of hernia. Psychiatric history significant for depression, anxiety. [place] [facility] [place] and most recently in July 2023. No hx of suicide attempts or NSSIB. Pt is a smoker 1ppd x 30 years, endorses frequent crack cocaine use. Prior incarcerations in '99, '03, and from 0315-1611. Pt has a 8yo son who lives with his mother in [place].  Denies any pending legal issues. Denies FH of psychiatric disorders, Denies alcohol use. Patient self presents to ED with complaint of SI in context of medication non-compliance and recent cocaine use.     As per ED Note: "Pt is a 49yo M, single, has 6 yo son who lives with his mother and is not n pt's custody, resides at the [facility] in [facility], unemployed, PMH significant for a ventral hernia and asthma, with psych h/o unspecified mood disorder (MDD vs substance-induced depressive disorder vs DMDD vs Bipolar I) and multiple substance related disorders (ccn, MJ, etoh, tobacco, sedatives, opioids), previous inpt rehab stays, prior psych admissions (last at [facility] 7/14-7/27/23), denies pSA/NSSIB, denies h/o aggression, +active crack/ccn use (reports having been incarcerated twice for theft related charges),followed by a psychiatrist at his shelter, on Wellbutrin 150mg, who self presents to the ED reporting depressive symptoms for the last 2 days."    Today Pt seen in his room laying in bed with ELENA Roblero. States that he is here for SI, and that he has not had is Wellbutrin in 5-6 days because he ran out and has not been able to see his psychiatrist to fill it. Pt would like to restart his Wellbutrin as it "keeps him level". He stated that he was on Lexipro once, but was too afraid of the sexual SE, so he stopped it. Pt states that the SI come and go, especially around the holidays because he is away from his son, but he has no plan and has never acted on his SI. States that his son is a reason to stay alive. Endorsed decreased appetite and wt loss leading up to his hospitalization, but feels as though it is coming back. Prior to this hospitalization, pt was going to the [facility] 4 days/week for 1:1 as well as group therapy, but states that it was too much and could not do that and hold a job. States that he would prefer therapy 2days/wk rather than 4. Pt is unsure where he will go after is 12mo are up at [facility]. He wants to try to get his life on track.   Pt states that he has a hernia that he was supposed to get repaired in the coming weeks. States that he has pre-op appts with cardiology and pulmonology.  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   Denies YADIRA HERRERA     This is a 47yo, single, domiciled at [facility]  male with medical hx of hernia. Psychiatric history significant for depression, anxiety. Prior psychiatric hospitalizations at [facility] in 2021 and most recently in July 2023. No hx of suicide attempts or NSSIB. Pt is a smoker 1ppd x 30 years, endorses frequent crack cocaine use. Prior incarcerations in '99, '03, and from 3186-1345. Pt has a 6yo son who lives with his mother in [place].  Denies any pending legal issues. Denies FH of psychiatric disorders, Denies alcohol use. Patient self presents to ED with complaint of SI in context of medication non-compliance and recent cocaine use.     As per ED Note: "Pt is a 47yo M, single, has 6 yo son who lives with his mother and is not n pt's custody, resides at the [facility] in [facility], unemployed, PMH significant for a ventral hernia and asthma, with psych h/o unspecified mood disorder (MDD vs substance-induced depressive disorder vs DMDD vs Bipolar I) and multiple substance related disorders (ccn, MJ, etoh, tobacco, sedatives, opioids), previous inpt rehab stays, prior psych admissions (last at [facility] 7/14-7/27/23), denies pSA/NSSIB, denies h/o aggression, +active crack/ccn use (reports having been incarcerated twice for theft related charges),followed by a psychiatrist at his shelter, on Wellbutrin 150mg, who self presents to the ED reporting depressive symptoms for the last 2 days."    Today Pt seen in his room laying in bed with ELENA Roblero. States that he is here for SI, and that he has not had is Wellbutrin in 5-6 days because he ran out and has not been able to see his psychiatrist to fill it. Pt would like to restart his Wellbutrin as it "keeps him level". He stated that he was on Lexipro once, but was too afraid of the sexual SE, so he stopped it. Pt states that the SI come and go, especially around the holidays because he is away from his son, but he has no plan and has never acted on his SI. States that his son is a reason to stay alive. Endorsed decreased appetite and wt loss leading up to his hospitalization, but feels as though it is coming back. Prior to this hospitalization, pt was going to the [facility] 4 days/week for 1:1 as well as group therapy, but states that it was too much and could not do that and hold a job. States that he would prefer therapy 2days/wk rather than 4. Pt is unsure where he will go after is 12mo are up at [facility]. He wants to try to get his life on track.   Pt states that he has a hernia that he was supposed to get repaired in the coming weeks. States that he has pre-op appts with cardiology and pulmonology.  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   Denies YADIRA HERRERA

## 2023-12-15 DIAGNOSIS — F14.10 COCAINE ABUSE, UNCOMPLICATED: ICD-10-CM

## 2023-12-15 LAB
RAPID RVP RESULT: SIGNIFICANT CHANGE UP
RAPID RVP RESULT: SIGNIFICANT CHANGE UP
SARS-COV-2 RNA SPEC QL NAA+PROBE: SIGNIFICANT CHANGE UP
SARS-COV-2 RNA SPEC QL NAA+PROBE: SIGNIFICANT CHANGE UP

## 2023-12-15 PROCEDURE — 99232 SBSQ HOSP IP/OBS MODERATE 35: CPT

## 2023-12-15 RX ADMIN — Medication 1 PATCH: at 09:58

## 2023-12-15 RX ADMIN — Medication 200 MILLIGRAM(S): at 02:02

## 2023-12-15 RX ADMIN — Medication 1 PATCH: at 06:59

## 2023-12-15 RX ADMIN — Medication 1 PATCH: at 18:21

## 2023-12-15 RX ADMIN — Medication 1 PATCH: at 12:00

## 2023-12-15 RX ADMIN — Medication 200 MILLIGRAM(S): at 21:51

## 2023-12-15 RX ADMIN — BUPROPION HYDROCHLORIDE 150 MILLIGRAM(S): 150 TABLET, EXTENDED RELEASE ORAL at 09:58

## 2023-12-15 NOTE — BH INPATIENT PSYCHIATRY PROGRESS NOTE - NSBHFUPINTERVALHXFT_PSY_A_CORE
Patient seen in his room today, resting on approach. He reports that he is adjusting well to unit without issue, happy to be back on his wellbutrin. No side effects or concerns. He denies si/hi/avh or PI at this time. No acute medical concerns, though he endorsed having a coughing fit last night and received cough syrup with good effect. No other respiratory symptoms. Fair sleep and appetite

## 2023-12-15 NOTE — BH INPATIENT PSYCHIATRY PROGRESS NOTE - NSBHCHARTREVIEWVS_PSY_A_CORE FT
Vital Signs Last 24 Hrs  T(C): 36.4 (12-15-23 @ 09:08), Max: 37.3 (12-14-23 @ 16:33)  T(F): 97.6 (12-15-23 @ 09:08), Max: 99.1 (12-14-23 @ 16:33)  HR: 67 (12-15-23 @ 09:08) (67 - 68)  BP: 112/77 (12-15-23 @ 09:08) (108/73 - 112/77)  BP(mean): --  RR: 18 (12-15-23 @ 09:08) (18 - 18)  SpO2: 95% (12-15-23 @ 09:08) (95% - 96%)

## 2023-12-15 NOTE — BH INPATIENT PSYCHIATRY PROGRESS NOTE - NSBHATTESTBILLING_PSY_A_CORE
21974-[id number] OBS or IP - moderate complexity OR 35-49 mins 12757-Odzuzdbazp OBS or IP - moderate complexity OR 35-49 mins

## 2023-12-15 NOTE — BH TREATMENT PLAN - NSTXDEPRESINTERRN_PSY_ALL_CORE
Encourage patient to adhere with prescribed medications and treatment. Encourage patient to attend groups, verbalize feelings and concerns. Help patient identify coping strategies that have worked in the past and support the use of these strategies.

## 2023-12-15 NOTE — BH INPATIENT PSYCHIATRY PROGRESS NOTE - NSBHASSESSSUMMFT_PSY_ALL_CORE
This is a 47yo, single, domiciled at [facility]  male with medical hx of hernia. Psychiatric history significant for depression, anxiety. Prior psychiatric hospitalizations at [facility] [place] and most recently in July 2023. No hx of suicide attempts or NSSIB. Pt is a smoker 1ppd x 30 years, endorses frequent crack cocaine use. Prior incarcerations in '99, '03, and from 6226-2442. Pt has a 8yo son who lives with his mother in [place].  Denies any pending legal issues. Denies FH of psychiatric disorders, Denies alcohol use. Patient self presents to ED with complaint of SI in context of medication non-compliance and recent cocaine use.       Dx: Depression   Ddx: Anxiety, Substance use d/o   Wellbutrin 150mg qd  NRT- Patch and Gum   Pt will attend groups as well as consider 1:1 therapy with psychology team   This is a 49yo, single, domiciled at [facility]  male with medical hx of hernia. Psychiatric history significant for depression, anxiety. [place] [facility] [place] and most recently in July 2023. No hx of suicide attempts or NSSIB. Pt is a smoker 1ppd x 30 years, endorses frequent crack cocaine use. Prior incarcerations in '99, '03, and from 6247-8296. Pt has a 8yo son who lives with his mother in [place].  Denies any pending legal issues. Denies FH of psychiatric disorders, Denies alcohol use. Patient self presents to ED with complaint of SI in context of medication non-compliance and recent cocaine use.       Dx: Depression   Ddx: Anxiety, Substance use d/o   Wellbutrin 150mg qd  NRT- Patch and Gum   Pt will attend groups as well as consider 1:1 therapy with psychology team

## 2023-12-15 NOTE — BH INPATIENT PSYCHIATRY PROGRESS NOTE - NSBHMETABOLIC_PSY_ALL_CORE_FT
BMI:   HbA1c: A1C with Estimated Average Glucose Result: 5.3 % (12-14-23 @ 16:28)    Glucose:   BP: 112/77 (12-15-23 @ 09:08) (108/73 - 113/71)Vital Signs Last 24 Hrs  T(C): 36.4 (12-15-23 @ 09:08), Max: 37.3 (12-14-23 @ 16:33)  T(F): 97.6 (12-15-23 @ 09:08), Max: 99.1 (12-14-23 @ 16:33)  HR: 67 (12-15-23 @ 09:08) (67 - 68)  BP: 112/77 (12-15-23 @ 09:08) (108/73 - 112/77)  BP(mean): --  RR: 18 (12-15-23 @ 09:08) (18 - 18)  SpO2: 95% (12-15-23 @ 09:08) (95% - 96%)      Lipid Panel: Date/Time: 12-14-23 @ 16:28  Cholesterol, Serum: 122  LDL Cholesterol Calculated: 71  HDL Cholesterol, Serum: 38  Total Cholesterol/HDL Ration Measurement: --  Triglycerides, Serum: 67

## 2023-12-16 PROCEDURE — 99232 SBSQ HOSP IP/OBS MODERATE 35: CPT

## 2023-12-16 RX ADMIN — Medication 200 MILLIGRAM(S): at 21:47

## 2023-12-16 RX ADMIN — Medication 1 PATCH: at 10:10

## 2023-12-16 RX ADMIN — BUPROPION HYDROCHLORIDE 150 MILLIGRAM(S): 150 TABLET, EXTENDED RELEASE ORAL at 10:08

## 2023-12-16 RX ADMIN — Medication 1 PATCH: at 18:23

## 2023-12-16 NOTE — BH INPATIENT PSYCHIATRY PROGRESS NOTE - NSBHMETABOLIC_PSY_ALL_CORE_FT
BMI:   HbA1c: A1C with Estimated Average Glucose Result: 5.3 % (12-14-23 @ 16:28)    Glucose:   BP: 100/64 (12-16-23 @ 09:25) (100/64 - 139/85)Vital Signs Last 24 Hrs  T(C): 36.5 (12-16-23 @ 09:25), Max: 36.5 (12-16-23 @ 09:25)  T(F): 97.7 (12-16-23 @ 09:25), Max: 97.7 (12-16-23 @ 09:25)  HR: 62 (12-16-23 @ 09:25) (62 - 71)  BP: 100/64 (12-16-23 @ 09:25) (100/64 - 139/85)  BP(mean): --  RR: 18 (12-16-23 @ 09:25) (18 - 18)  SpO2: 95% (12-16-23 @ 09:25) (95% - 100%)      Lipid Panel: Date/Time: 12-14-23 @ 16:28  Cholesterol, Serum: 122  LDL Cholesterol Calculated: 71  HDL Cholesterol, Serum: 38  Total Cholesterol/HDL Ration Measurement: --  Triglycerides, Serum: 67   BMI:   HbA1c: A1C with Estimated Average Glucose Result: 5.3 % (12-14-23 @ 16:28)    Glucose:   BP: 100/64 (12-16-23 @ 09:25) (100/64 - 139/85)Vital Signs Last 24 Hrs  T(C): 36.5 (12-16-23 @ 09:25), Max: 36.5 (12-16-23 @ 09:25)  T(F): 97.7 (12-16-23 @ 09:25), Max: 97.7 (12-16-23 @ 09:25)  HR: 62 (12-16-23 @ 09:25) (62 - 62)  BP: 100/64 (12-16-23 @ 09:25) (100/64 - 100/64)  BP(mean): --  RR: 18 (12-16-23 @ 09:25) (18 - 18)  SpO2: 95% (12-16-23 @ 09:25) (95% - 95%)      Lipid Panel: Date/Time: 12-14-23 @ 16:28  Cholesterol, Serum: 122  LDL Cholesterol Calculated: 71  HDL Cholesterol, Serum: 38  Total Cholesterol/HDL Ration Measurement: --  Triglycerides, Serum: 67

## 2023-12-16 NOTE — BH INPATIENT PSYCHIATRY PROGRESS NOTE - NSBHASSESSSUMMFT_PSY_ALL_CORE
This is a 47yo, single, domiciled at [facility]  male with medical hx of hernia. Psychiatric history significant for depression, anxiety. Prior psychiatric hospitalizations at [facility] [place] in 2021 and most recently in July 2023. No hx of suicide attempts or NSSIB. Pt is a smoker 1ppd x 30 years, endorses frequent crack cocaine use. Prior incarcerations in '99, '03, and from 5809-9092. Pt has a 8yo son who lives with his mother in [place].  Denies any pending legal issues. Denies FH of psychiatric disorders, Denies alcohol use. Patient self presents to ED with complaint of SI in context of medication non-compliance and recent cocaine use.     Plan:  Dx: Depression   Ddx: Anxiety, Substance use disorder  - Continue bupropion (Wellbutrin)  mg PO daily  - Continue trazodone 50 mg PO qhs PRN for insomnia  - Continue NRT- Nicotine 21mg/24hr Patch and nicotine Polacrilex Gum 2 mg PO q2h PRN   - Continue albuterol 90 mcg HFA Inhaler 2 puffs inhalation q6h PRN for SOB   - Pt will attend groups as well as consider 1:1 therapy with psychology team   This is a 49yo, single, domiciled at [facility]  male with medical hx of hernia. Psychiatric history significant for depression, anxiety. Prior psychiatric hospitalizations at [facility] [place] in 2021 and most recently in July 2023. No hx of suicide attempts or NSSIB. Pt is a smoker 1ppd x 30 years, endorses frequent crack cocaine use. Prior incarcerations in '99, '03, and from 7896-6246. Pt has a 8yo son who lives with his mother in [place].  Denies any pending legal issues. Denies FH of psychiatric disorders, Denies alcohol use. Patient self presents to ED with complaint of SI in context of medication non-compliance and recent cocaine use.     Plan:  Dx: Depression   Ddx: Anxiety, Substance use disorder  - Continue bupropion (Wellbutrin)  mg PO daily  - Continue trazodone 50 mg PO qhs PRN for insomnia  - Continue NRT- Nicotine 21mg/24hr Patch and nicotine Polacrilex Gum 2 mg PO q2h PRN   - Continue albuterol 90 mcg HFA Inhaler 2 puffs inhalation q6h PRN for SOB   - Pt will attend groups as well as consider 1:1 therapy with psychology team

## 2023-12-16 NOTE — BH INPATIENT PSYCHIATRY PROGRESS NOTE - NSBHFUPINTERVALHXFT_PSY_A_CORE
Chart and nursing notes reviewed.  No acute events overnight, VSS.  The patient has been interacting appropriately with staff and peers and has been compliant with medications.  The patient continues to tolerate medications and denies any medication side effects.    On evaluation, the patient is calm, cooperative, demonstrating good behavioral control and linear thought processes.      Patient seen in his room today, resting on approach. He reports that he is adjusting well to unit without issue, happy to be back on his wellbutrin. No side effects or concerns. He denies si/hi/avh or PI at this time. No acute medical concerns, though he endorsed having a coughing fit last night and received cough syrup with good effect. No other respiratory symptoms. Fair sleep and appetite       Chart and nursing notes reviewed.  No acute events overnight, VSS.  The patient has been interacting appropriately with staff and peers and has been compliant with medications.  The patient continues to tolerate medications and denies any medication side effects.    On evaluation, the patient is calm, cooperative, demonstrating good behavioral control and linear thought processes.  The patient reports that his mood is "alright" and reports feeling better now that he has been restarted on Wellbutrin.  He reports a cough which has responded well to medications.  He denies any other respiratory symptoms.  He reports fair appetite and sleep.  He denies si/hi/avh and no paranoia or delusions are reported or elicited at this time. All questions and concerns addressed.

## 2023-12-16 NOTE — BH INPATIENT PSYCHIATRY PROGRESS NOTE - NSBHCHARTREVIEWLAB_PSY_A_CORE FT
CBC (12/14/23): WBC 10.68 (elevated)  CMP (12/14/23): ALT 9 (low)  TSH (12/14/23): 1.790 (wnl)  Hemoglobin A1c (12/14/23): 5.3  Lipid Panel (12/14/23): HDL 38 (low)

## 2023-12-16 NOTE — BH INPATIENT PSYCHIATRY PROGRESS NOTE - NSBHATTESTBILLING_PSY_A_CORE
83649-Bktacdoewk OBS or IP - moderate complexity OR 35-49 mins 27732-Dopqlyhsgh OBS or IP - moderate complexity OR 35-49 mins 25489-Xpzobbusfg OBS or IP - low complexity OR 25-34 mins 55184-Knpniejfit OBS or IP - low complexity OR 25-34 mins

## 2023-12-17 PROCEDURE — 99231 SBSQ HOSP IP/OBS SF/LOW 25: CPT

## 2023-12-17 RX ADMIN — Medication 1 PATCH: at 07:07

## 2023-12-17 RX ADMIN — Medication 1 PATCH: at 10:55

## 2023-12-17 RX ADMIN — BUPROPION HYDROCHLORIDE 150 MILLIGRAM(S): 150 TABLET, EXTENDED RELEASE ORAL at 10:55

## 2023-12-17 RX ADMIN — Medication 1 PATCH: at 19:14

## 2023-12-17 RX ADMIN — Medication 200 MILLIGRAM(S): at 21:55

## 2023-12-17 NOTE — BH INPATIENT PSYCHIATRY PROGRESS NOTE - NSBHCHARTREVIEWVS_PSY_A_CORE FT
Vital Signs Last 24 Hrs  T(C): 37 (12-17-23 @ 17:09), Max: 37.2 (12-17-23 @ 09:19)  T(F): 98.6 (12-17-23 @ 17:09), Max: 98.9 (12-17-23 @ 09:19)  HR: 65 (12-17-23 @ 17:09) (65 - 69)  BP: 115/72 (12-17-23 @ 17:09) (113/74 - 115/72)  BP(mean): --  RR: 18 (12-17-23 @ 09:19) (18 - 18)  SpO2: 98% (12-17-23 @ 17:09) (95% - 98%)

## 2023-12-17 NOTE — BH INPATIENT PSYCHIATRY PROGRESS NOTE - NSBHASSESSSUMMFT_PSY_ALL_CORE
This is a 49yo, single, domiciled at [facility]  male with medical hx of hernia. Psychiatric history significant for depression, anxiety. Prior psychiatric hospitalizations at [facility] [place] in 2021 and most recently in July 2023. No hx of suicide attempts or NSSIB. Pt is a smoker 1ppd x 30 years, endorses frequent crack cocaine use. Prior incarcerations in '99, '03, and from 5578-0898. Pt has a 8yo son who lives with his mother in [place].  Denies any pending legal issues. Denies FH of psychiatric disorders, Denies alcohol use. Patient self presents to ED with complaint of SI in context of medication non-compliance and recent cocaine use.     12.17: patient reports no acute complaints and denies SI at this time. He reports no side effects to wellbutrin.     Plan:  Dx: Depression   Ddx: Anxiety, Substance use disorder  - Continue bupropion (Wellbutrin)  mg PO daily  - Continue trazodone 50 mg PO qhs PRN for insomnia  - Continue NRT- Nicotine 21mg/24hr Patch and nicotine Polacrilex Gum 2 mg PO q2h PRN   - Continue albuterol 90 mcg HFA Inhaler 2 puffs inhalation q6h PRN for SOB   - Pt will attend groups as well as consider 1:1 therapy with psychology team   This is a 49yo, single, domiciled at [facility]  male with medical hx of hernia. Psychiatric history significant for depression, anxiety. Prior psychiatric hospitalizations at [facility] in 2021 and most recently in July 2023. No hx of suicide attempts or NSSIB. Pt is a smoker 1ppd x 30 years, endorses frequent crack cocaine use. Prior incarcerations in '99, '03, and from 8919-9502. Pt has a 6yo son who lives with his mother in [place].  Denies any pending legal issues. Denies FH of psychiatric disorders, Denies alcohol use. Patient self presents to ED with complaint of SI in context of medication non-compliance and recent cocaine use.     12.17: patient reports no acute complaints and denies SI at this time. He reports no side effects to wellbutrin.     Plan:  Dx: Depression   Ddx: Anxiety, Substance use disorder  - Continue bupropion (Wellbutrin)  mg PO daily  - Continue trazodone 50 mg PO qhs PRN for insomnia  - Continue NRT- Nicotine 21mg/24hr Patch and nicotine Polacrilex Gum 2 mg PO q2h PRN   - Continue albuterol 90 mcg HFA Inhaler 2 puffs inhalation q6h PRN for SOB   - Pt will attend groups as well as consider 1:1 therapy with psychology team

## 2023-12-17 NOTE — BH INPATIENT PSYCHIATRY PROGRESS NOTE - NSBHFUPINTERVALHXFT_PSY_A_CORE
No acute events overnight. The patient reports that he has been sleeping during the day because he has been "bored" on the unit. He denies side effects to wellbutrin, reports no current SI but does report some anxiety about next steps once he is discharged. He has no other acute complaints.

## 2023-12-17 NOTE — BH INPATIENT PSYCHIATRY PROGRESS NOTE - NSBHATTESTBILLING_PSY_A_CORE
05459-Iowvgrojko OBS or IP - low complexity OR 25-34 mins 26475-Dmftjrqvgf OBS or IP - low complexity OR 25-34 mins

## 2023-12-17 NOTE — BH INPATIENT PSYCHIATRY PROGRESS NOTE - NSBHMETABOLIC_PSY_ALL_CORE_FT
BMI:   HbA1c: A1C with Estimated Average Glucose Result: 5.3 % (12-14-23 @ 16:28)    Glucose:   BP: 115/72 (12-17-23 @ 17:09) (100/64 - 139/85)Vital Signs Last 24 Hrs  T(C): 37 (12-17-23 @ 17:09), Max: 37.2 (12-17-23 @ 09:19)  T(F): 98.6 (12-17-23 @ 17:09), Max: 98.9 (12-17-23 @ 09:19)  HR: 65 (12-17-23 @ 17:09) (65 - 69)  BP: 115/72 (12-17-23 @ 17:09) (113/74 - 115/72)  BP(mean): --  RR: 18 (12-17-23 @ 09:19) (18 - 18)  SpO2: 98% (12-17-23 @ 17:09) (95% - 98%)      Lipid Panel: Date/Time: 12-14-23 @ 16:28  Cholesterol, Serum: 122  LDL Cholesterol Calculated: 71  HDL Cholesterol, Serum: 38  Total Cholesterol/HDL Ration Measurement: --  Triglycerides, Serum: 67

## 2023-12-18 DIAGNOSIS — F19.94 OTHER PSYCHOACTIVE SUBSTANCE USE, UNSPECIFIED WITH PSYCHOACTIVE SUBSTANCE-INDUCED MOOD DISORDER: ICD-10-CM

## 2023-12-18 PROCEDURE — 99231 SBSQ HOSP IP/OBS SF/LOW 25: CPT

## 2023-12-18 RX ORDER — NICOTINE POLACRILEX 2 MG
1 GUM BUCCAL
Qty: 2 | Refills: 0
Start: 2023-12-18 | End: 2024-01-16

## 2023-12-18 RX ORDER — BUPROPION HYDROCHLORIDE 150 MG/1
1 TABLET, EXTENDED RELEASE ORAL
Qty: 14 | Refills: 0
Start: 2023-12-18 | End: 2023-12-31

## 2023-12-18 RX ORDER — BUPROPION HYDROCHLORIDE 150 MG/1
1 TABLET, EXTENDED RELEASE ORAL
Qty: 0 | Refills: 0 | DISCHARGE
Start: 2023-12-18

## 2023-12-18 RX ADMIN — BUPROPION HYDROCHLORIDE 150 MILLIGRAM(S): 150 TABLET, EXTENDED RELEASE ORAL at 11:00

## 2023-12-18 RX ADMIN — Medication 1 PATCH: at 18:04

## 2023-12-18 RX ADMIN — Medication 1 PATCH: at 10:59

## 2023-12-18 RX ADMIN — Medication 200 MILLIGRAM(S): at 21:22

## 2023-12-18 RX ADMIN — Medication 1 PATCH: at 09:00

## 2023-12-18 NOTE — BH INPATIENT PSYCHIATRY PROGRESS NOTE - NSBHMETABOLIC_PSY_ALL_CORE_FT
BMI:   HbA1c: A1C with Estimated Average Glucose Result: 5.3 % (12-14-23 @ 16:28)    Glucose:   BP: 108/71 (12-18-23 @ 08:12) (100/64 - 139/85)Vital Signs Last 24 Hrs  T(C): 37.2 (12-18-23 @ 08:12), Max: 37.2 (12-18-23 @ 08:12)  T(F): 98.9 (12-18-23 @ 08:12), Max: 98.9 (12-18-23 @ 08:12)  HR: 60 (12-18-23 @ 08:12) (60 - 65)  BP: 108/71 (12-18-23 @ 08:12) (108/71 - 115/72)  BP(mean): --  RR: --  SpO2: 96% (12-18-23 @ 08:12) (96% - 98%)      Lipid Panel: Date/Time: 12-14-23 @ 16:28  Cholesterol, Serum: 122  LDL Cholesterol Calculated: 71  HDL Cholesterol, Serum: 38  Total Cholesterol/HDL Ration Measurement: --  Triglycerides, Serum: 67

## 2023-12-18 NOTE — BH INPATIENT PSYCHIATRY PROGRESS NOTE - NSTXTOBACOGOAL_PSY_ALL_CORE
Will accept nicotine replacement therapy

## 2023-12-18 NOTE — BH INPATIENT PSYCHIATRY PROGRESS NOTE - NSBHASSESSSUMMFT_PSY_ALL_CORE
This is a 47yo, single, domiciled at [facility]  male with medical hx of hernia. Psychiatric history significant for depression, anxiety. Prior psychiatric hospitalizations at [facility] [place] in 2021 and most recently in July 2023. No hx of suicide attempts or NSSIB. Pt is a smoker 1ppd x 30 years, endorses frequent crack cocaine use. Prior incarcerations in '99, '03, and from 7243-4440. Pt has a 8yo son who lives with his mother in [place].  Denies any pending legal issues. Denies FH of psychiatric disorders, Denies alcohol use. Patient self presents to ED with complaint of SI in context of medication non-compliance and recent cocaine use.     12.17: patient reports no acute complaints and denies SI at this time. He reports no side effects to wellbutrin.     Plan:  Dx: Depression   Ddx: Anxiety, Substance use disorder  - Continue bupropion (Wellbutrin)  mg PO daily  - Continue trazodone 50 mg PO qhs PRN for insomnia  - Continue NRT- Nicotine 21mg/24hr Patch and nicotine Polacrilex Gum 2 mg PO q2h PRN   - Continue albuterol 90 mcg HFA Inhaler 2 puffs inhalation q6h PRN for SOB   - Pt will attend groups as well as consider 1:1 therapy with psychology team   This is a 47yo, single, domiciled at [facility]  male with medical hx of hernia. Psychiatric history significant for depression, anxiety. [place] [facility] [place] and most recently in July 2023. No hx of suicide attempts or NSSIB. Pt is a smoker 1ppd x 30 years, endorses frequent crack cocaine use. Prior incarcerations in '99, '03, and from 8387-7561. Pt has a 6yo son who lives with his mother in [place].  Denies any pending legal issues. Denies FH of psychiatric disorders, Denies alcohol use. Patient self presents to ED with complaint of SI in context of medication non-compliance and recent cocaine use.     12.17: patient reports no acute complaints and denies SI at this time. He reports no side effects to wellbutrin.     Plan:  Dx: Depression   Ddx: Anxiety, Substance use disorder  - Continue bupropion (Wellbutrin)  mg PO daily  - Continue trazodone 50 mg PO qhs PRN for insomnia  - Continue NRT- Nicotine 21mg/24hr Patch and nicotine Polacrilex Gum 2 mg PO q2h PRN   - Continue albuterol 90 mcg HFA Inhaler 2 puffs inhalation q6h PRN for SOB   - Pt will attend groups as well as consider 1:1 therapy with psychology team

## 2023-12-18 NOTE — BH INPATIENT PSYCHIATRY PROGRESS NOTE - NSBHATTESTAPPBILLTIME_PSY_A_CORE
I attest my time as RENNY is greater than 50% of the total combined time spent on qualifying patient care activities. I have reviewed and verified the documentation.
I attest my time as RENNY is greater than 50% of the total combined time spent on qualifying patient care activities. I have reviewed and verified the documentation.

## 2023-12-18 NOTE — BH INPATIENT PSYCHIATRY PROGRESS NOTE - NSTXDCOPNOGOALOTHER_PSY_ALL_CORE
Patient will explore psychosocial issues and barriers to discharge with  while engaging in discharge planning for 30 minutes per week.

## 2023-12-18 NOTE — BH INPATIENT PSYCHIATRY PROGRESS NOTE - NSTXTOBACOINTERMD_PSY_ALL_CORE
psychopharmacology x15 minutes

## 2023-12-18 NOTE — BH INPATIENT PSYCHIATRY PROGRESS NOTE - PRN MEDS
MEDICATIONS  (PRN):  albuterol    90 MICROgram(s) HFA Inhaler 2 Puff(s) Inhalation every 6 hours PRN Shortness of Breath  guaiFENesin Oral Liquid (Sugar-Free) 200 milliGRAM(s) Oral every 6 hours PRN cough  nicotine  Polacrilex Gum 2 milliGRAM(s) Oral every 2 hours PRN NRT  traZODone 50 milliGRAM(s) Oral at bedtime PRN insomnia  

## 2023-12-18 NOTE — BH INPATIENT PSYCHIATRY PROGRESS NOTE - NSBHATTESTTYPEVISIT_PSY_A_CORE
Attending Only
On-site Attending supervising RENNY (99XXX codes)
On-site Attending supervising RENNY (99XXX codes)
Attending Only

## 2023-12-18 NOTE — BH INPATIENT PSYCHIATRY PROGRESS NOTE - NSBHATTESTBILLING_PSY_A_CORE
85144-Pchlhynqdu OBS or IP - low complexity OR 25-34 mins 47755-Hbevoqkviq OBS or IP - low complexity OR 25-34 mins

## 2023-12-18 NOTE — BH INPATIENT PSYCHIATRY PROGRESS NOTE - NSTXSUBMISGOAL_PSY_ALL_CORE
Will attend 1 self-help meeting daily

## 2023-12-18 NOTE — BH INPATIENT PSYCHIATRY PROGRESS NOTE - NSBHFUPINTERVALHXFT_PSY_A_CORE
Patient seen in his room, reading on approach. He reports having a 'slow' weekend, attending some groups and interacting with peers. He has been compliant with medication regimen and mood is overall better though he is still depressed that he wasn't able to work enough to get money to have a good [place]. No si/hi/avh or PI endorsed. No new medical concerns.  Patient seen in his room, reading on approach. He reports having a 'slow' weekend, attending some groups and interacting with peers. He has been compliant with medication regimen and mood is overall better though he is still depressed that he wasn't able to work enough to get money to have a good [place]. No si/hi/avh or PI endorsed. No new medical concerns.

## 2023-12-18 NOTE — BH INPATIENT PSYCHIATRY PROGRESS NOTE - NSBHCONSULTIPREASON_PSY_A_CORE
danger to self; mental illness expected to respond to inpatient care
other reason

## 2023-12-18 NOTE — BH INPATIENT PSYCHIATRY PROGRESS NOTE - NSICDXBHSECONDARYDX_PSY_ALL_CORE
Anxiety   F41.9  Cocaine use disorder   F14.10  
Anxiety   F41.9  Cocaine use disorder   F14.10  Substance induced mood disorder   F19.94

## 2023-12-18 NOTE — BH INPATIENT PSYCHIATRY PROGRESS NOTE - NSBHCHARTREVIEWVS_PSY_A_CORE FT
Vital Signs Last 24 Hrs  T(C): 37.2 (12-18-23 @ 08:12), Max: 37.2 (12-18-23 @ 08:12)  T(F): 98.9 (12-18-23 @ 08:12), Max: 98.9 (12-18-23 @ 08:12)  HR: 60 (12-18-23 @ 08:12) (60 - 65)  BP: 108/71 (12-18-23 @ 08:12) (108/71 - 115/72)  BP(mean): --  RR: --  SpO2: 96% (12-18-23 @ 08:12) (96% - 98%)

## 2023-12-18 NOTE — BH INPATIENT PSYCHIATRY PROGRESS NOTE - NSTXDCOPNOPROGRES_PSY_ALL_CORE
Outpatient Occupational Therapy Peds Treatment Note  [facility]     Patient Name: Los Thomas  : 2019  MRN: [id number]  Today's Date: 4/3/2019       Visit Date: 2019  Patient Active Problem List   Diagnosis   • Prematurity     No past medical history on file.  No past surgical history on file.    Visit Dx:    ICD-10-CM ICD-9-CM   1. Prematurity P07.30 765.10     765.20                    OT Assessment/Plan     Row Name 19 1245          OT Assessment    Assessment Comments  Pt. seen this date for treatment.  Pt. tolerated RUE ROM, gentle stretching, weight bearing for neuro re-education. Pt. worked on placing small balls into ball tube using her right hand with her left hand restrained. Pt. worked on walking using a roller walker. Pt. tolerated treatment well this date.   -AS       User Key  (r) = Recorded By, (t) = Taken By, (c) = Cosigned By    Initials Name Provider Type    AS Vania Beyer, OT Occupational Therapist                OT Exercises     Row Name 19 1200             Subjective Comments    Subjective Comments  mom states that Los saw the neurologist today. She thinks she has mild CP, but wants her to have an MRI for diagnostic purposes.   -AS         Exercise 1    Exercise Name 1  fine motor play: blocks, puzzles, rattles, shape sorter  -AS         Exercise 2    Exercise Name 2  bilateral play: bringing hands to midline, clapping, holding a ball etc.   -AS         Exercise 3    Exercise Name 3  UB strengthening: ROM , stretching, weight bearing, crawling, etc.  -AS        User Key  (r) = Recorded By, (t) = Taken By, (c) = Cosigned By    Initials Name Provider Type    AS Vania Beyer, OT Occupational Therapist                   Time Calculation:   OT Start Time: 1100  OT Stop Time: 1145  OT Time Calculation (min): 45 min   Therapy Charges for Today     Code Description Service Date Service Provider Modifiers Qty    [id number]  OT THERAPEUTIC ACT EA 15 MIN 4/3/2019 
Pepito, Vania VASQUEZ, OT GO 2              Vania Beyer, BRETT  4/3/2019  
No Change

## 2023-12-18 NOTE — BH INPATIENT PSYCHIATRY PROGRESS NOTE - CURRENT MEDICATION
MEDICATIONS  (STANDING):  buPROPion XL (24-Hour) 150 milliGRAM(s) Oral daily  nicotine - 21 mG/24Hr(s) Patch 1 Patch Transdermal daily    MEDICATIONS  (PRN):  albuterol    90 MICROgram(s) HFA Inhaler 2 Puff(s) Inhalation every 6 hours PRN Shortness of Breath  guaiFENesin Oral Liquid (Sugar-Free) 200 milliGRAM(s) Oral every 6 hours PRN cough  nicotine  Polacrilex Gum 2 milliGRAM(s) Oral every 2 hours PRN NRT  traZODone 50 milliGRAM(s) Oral at bedtime PRN insomnia  

## 2023-12-19 VITALS
TEMPERATURE: 98 F | SYSTOLIC BLOOD PRESSURE: 112 MMHG | OXYGEN SATURATION: 97 % | WEIGHT: 197.09 LBS | HEART RATE: 62 BPM | DIASTOLIC BLOOD PRESSURE: 75 MMHG

## 2023-12-19 PROCEDURE — 99238 HOSP IP/OBS DSCHRG MGMT 30/<: CPT

## 2023-12-19 PROCEDURE — 36415 COLL VENOUS BLD VENIPUNCTURE: CPT

## 2023-12-19 PROCEDURE — 80053 COMPREHEN METABOLIC PANEL: CPT

## 2023-12-19 PROCEDURE — 80061 LIPID PANEL: CPT

## 2023-12-19 PROCEDURE — 85027 COMPLETE CBC AUTOMATED: CPT

## 2023-12-19 PROCEDURE — 0225U NFCT DS DNA&RNA 21 SARSCOV2: CPT

## 2023-12-19 PROCEDURE — 83036 HEMOGLOBIN GLYCOSYLATED A1C: CPT

## 2023-12-19 PROCEDURE — 84443 ASSAY THYROID STIM HORMONE: CPT

## 2023-12-19 RX ADMIN — Medication 1 PATCH: at 11:00

## 2023-12-19 RX ADMIN — Medication 1 PATCH: at 19:02

## 2023-12-19 RX ADMIN — BUPROPION HYDROCHLORIDE 150 MILLIGRAM(S): 150 TABLET, EXTENDED RELEASE ORAL at 09:59

## 2023-12-19 RX ADMIN — Medication 1 PATCH: at 10:01

## 2023-12-19 RX ADMIN — Medication 1 PATCH: at 07:24

## 2023-12-19 NOTE — BH DISCHARGE NOTE NURSING/SOCIAL WORK/PSYCH REHAB - [facility]
Unit Name: [address] Phone Number: [phone] Unit Name: [address] Phone Number: [phone]

## 2023-12-19 NOTE — BH DISCHARGE NOTE NURSING/SOCIAL WORK/PSYCH REHAB - NSDCADDINFO1FT_PSY_ALL_CORE
Appointment on Wednesday, 12/20/2023 at 01:00PM. This is an in-person appointment with Counselor Wenceslao. Patient is established with this provider.

## 2023-12-19 NOTE — BH INPATIENT PSYCHIATRY DISCHARGE NOTE - NSBHASSESSSUMMFT_PSY_ALL_CORE
This is a 49yo, single, domiciled at [facility]  male with medical hx of hernia. Psychiatric history significant for depression, anxiety. [place] [facility] [place]23. No hx of suicide attempts or NSSIB. Pt is a smoker 1ppd x 30 years, endorses frequent crack cocaine use. Prior incarcerations in '99, '03, and from 5881-5337. Pt has a 8yo son who lives with his mother in [place].  Denies any pending legal issues. Denies FH of psychiatric disorders, Denies alcohol use. Patient self presents to ED with complaint of SI in context of medication non-  Plan:  Dx: Depression   Ddx: Anxiety, Substance use disorder  - Continue bupropion (Wellbutrin)  mg PO daily  - Continue trazodone 50 mg PO qhs PRN for insomnia  - Continue NRT- Nicotine 21mg/24hr Patch and nicotine Polacrilex Gum 2 mg PO q2h PRN   - Continue albuterol 90 mcg HFA Inhaler 2 puffs inhalation q6h PRN for SOB   - Pt will attend groups as well as consider 1:1 therapy with psychology team   This is a 47yo, single, domiciled at [facility]  male with medical hx of hernia. Psychiatric history significant for depression, anxiety. Prior psychiatric hospitalizations at [facility] in 2021 and most recently in July 2023. No hx of suicide attempts or NSSIB. Pt is a smoker 1ppd x 30 years, endorses frequent crack cocaine use. Prior incarcerations in '99, '03, and from 3875-1131. Pt has a 8yo son who lives with his mother in [place].  Denies any pending legal issues. Denies FH of psychiatric disorders, Denies alcohol use. Patient self presents to ED with complaint of SI in context of medication non-  Plan:  Dx: Depression   Ddx: Anxiety, Substance use disorder  - Continue bupropion (Wellbutrin)  mg PO daily  - Continue trazodone 50 mg PO qhs PRN for insomnia  - Continue NRT- Nicotine 21mg/24hr Patch and nicotine Polacrilex Gum 2 mg PO q2h PRN   - Continue albuterol 90 mcg HFA Inhaler 2 puffs inhalation q6h PRN for SOB   - Pt will attend groups as well as consider 1:1 therapy with psychology team

## 2023-12-19 NOTE — BH DISCHARGE NOTE NURSING/SOCIAL WORK/PSYCH REHAB - PATIENT PORTAL LINK FT
You can access the FollowMyHealth Patient Portal offered by [facility] by registering at the following website: [email or web address]. By joining [employer]’s FollowMyHealth portal, you will also be able to view your health information using other applications (apps) compatible with our system. You can access the FollowMyHealth Patient Portal offered by [facility] by registering at the following website: [email or web address]. By joining [employer]’s FollowMyHealth portal, you will also be able to view your health information using other applications (apps) compatible with our system.

## 2023-12-19 NOTE — BH INPATIENT PSYCHIATRY DISCHARGE NOTE - HOSPITAL COURSE
Upon admission to unit, pt was restarted on home meds, wellbutrin 150mg xl. Patient reports sxs of congestion, cough, no fever. RSV panel was negative. No acute medical concerns. No psychiatric concerns. Patient visible on the unit, attending select groups, appropriately interacting with peers and staff. He was future oriented throughout his admission, identifying that him running out his medications, experiencing frustration regarding the holiday season and decreased work hours/money due to outpatient rehab affected his mood and si. He denied si/hi/avh or PI throughout admission, endorsed improvement in mood each day and was motivated to continue treatment outpatient.

## 2023-12-19 NOTE — BH DISCHARGE NOTE NURSING/SOCIAL WORK/PSYCH REHAB - NSDCOTHERNUM_GEN_ALL_CORE
[phone]  [email or web address]  [phone]  [email or web address]

## 2023-12-19 NOTE — BH INPATIENT PSYCHIATRY DISCHARGE NOTE - NSDCCPCAREPLAN_GEN_ALL_CORE_FT
PRINCIPAL DISCHARGE DIAGNOSIS  Diagnosis: Depression  Assessment and Plan of Treatment: Continue medication regimen and follow up with aftercare appointments      SECONDARY DISCHARGE DIAGNOSES  Diagnosis: Cocaine use disorder  Assessment and Plan of Treatment: Continue medication regimen and follow up with aftercare appointments

## 2023-12-19 NOTE — BH INPATIENT PSYCHIATRY DISCHARGE NOTE - NSBHMETABOLIC_PSY_ALL_CORE_FT
BMI:   HbA1c: A1C with Estimated Average Glucose Result: 5.3 % (12-14-23 @ 16:28)    Glucose:   BP: 112/75 (12-19-23 @ 08:05) (98/64 - 115/72)Vital Signs Last 24 Hrs  T(C): 36.8 (12-19-23 @ 08:05), Max: 37.5 (12-18-23 @ 16:36)  T(F): 98.2 (12-19-23 @ 08:05), Max: 99.5 (12-18-23 @ 16:36)  HR: 62 (12-19-23 @ 08:05) (62 - 70)  BP: 112/75 (12-19-23 @ 08:05) (98/64 - 112/75)  BP(mean): --  RR: --  SpO2: 97% (12-19-23 @ 08:05) (96% - 97%)      Lipid Panel: Date/Time: 12-14-23 @ 16:28  Cholesterol, Serum: 122  LDL Cholesterol Calculated: 71  HDL Cholesterol, Serum: 38  Total Cholesterol/HDL Ration Measurement: --  Triglycerides, Serum: 67

## 2023-12-19 NOTE — BH SAFETY PLAN - SUICIDE PREVENTION LIFELINE PHONES
Suicide Prevention Lifeline Phone: [phone] ([phone]) Suicide Prevention Lifeline Phone: [phone] TALK ([phone])

## 2023-12-19 NOTE — BH DISCHARGE NOTE NURSING/SOCIAL WORK/PSYCH REHAB - NSTOBACCOOTHER_PSY_ALL_CORE_FT
Call [facility] Smokers' Quitline at [phone]-JH-QUITS ([phone]) or visit [email or web address] if interested.  Call [facility] Smokers' Quitline at [phone]-QUITS ([phone]) or visit [email or web address] if interested.

## 2023-12-19 NOTE — BH INPATIENT PSYCHIATRY DISCHARGE NOTE - HPI (INCLUDE ILLNESS QUALITY, SEVERITY, DURATION, TIMING, CONTEXT, MODIFYING FACTORS, ASSOCIATED SIGNS AND SYMPTOMS)
This is a 49yo, single, domiciled at [facility]  male with medical hx of hernia. Psychiatric history significant for depression, anxiety. Prior psychiatric hospitalizations at [facility] [place] and most recently in July 2023. No hx of suicide attempts or NSSIB. Pt is a smoker 1ppd x 30 years, endorses frequent crack cocaine use. Prior incarcerations in '99, '03, and from 8254-3506. Pt has a 6yo son who lives with his mother in [place].  Denies any pending legal issues. Denies FH of psychiatric disorders, Denies alcohol use. Patient self presents to ED with complaint of SI in context of medication non-compliance and recent cocaine use.     As per ED Note: "Pt is a 49yo M, single, has 6 yo son who lives with his mother and is not n pt's custody, resides at the [facility]"" in [facility], unemployed, PMH significant for a ventral hernia and asthma, with psych h/o unspecified mood disorder (MDD vs substance-induced depressive disorder vs DMDD vs Bipolar I) and multiple substance related disorders (ccn, MJ, etoh, tobacco, sedatives, opioids), previous inpt rehab stays, prior psych admissions (last at [facility] 7/14-7/27/23), denies pSA/NSSIB, denies h/o aggression, +active crack/ccn use (reports having been incarcerated twice for theft related charges),followed by a psychiatrist at his shelter, on Wellbutrin 150mg, who self presents to the ED reporting depressive symptoms for the last 2 days."    Today Pt seen in his room laying in bed with ELENA Roblero. States that he is here for SI, and that he has not had is Wellbutrin in 5-6 days because he ran out and has not been able to see his psychiatrist to fill it. Pt would like to restart his Wellbutrin as it "keeps him level". He stated that he was on Lexipro once, but was too afraid of the sexual SE, so he stopped it. Pt states that the SI come and go, especially around the holidays because he is away from his son, but he has no plan and has never acted on his SI. States that his son is a reason to stay alive. Endorsed decreased appetite and wt loss leading up to his hospitalization, but feels as though it is coming back. Prior to this hospitalization, pt was going to the [facility] 4 days/week for 1:1 as well as group therapy, but states that it was too much and could not do that and hold a job. States that he would prefer therapy 2days/wk rather than 4. Pt is unsure where he will go after is 12mo are up at [employer]. He wants to try to get his life on track.   Pt states that he has a hernia that he was supposed to get repaired in the coming weeks. States that he has pre-op appts with cardiology and pulmonology.  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   Denies YADIRA HERRERA     This is a 47yo, single, domiciled at [facility]  male with medical hx of hernia. Psychiatric history significant for depression, anxiety. Prior psychiatric hospitalizations at [facility] in 2021 and most recently in July 2023. No hx of suicide attempts or NSSIB. Pt is a smoker 1ppd x 30 years, endorses frequent crack cocaine use. Prior incarcerations in '99, '03, and from 7555-5010. Pt has a 8yo son who lives with his mother in [place].  Denies any pending legal issues. Denies FH of psychiatric disorders, Denies alcohol use. Patient self presents to ED with complaint of SI in context of medication non-compliance and recent cocaine use.     As per ED Note: "Pt is a 47yo M, single, has 6 yo son who lives with his mother and is not n pt's custody, resides at the [facility] in [facility], unemployed, PMH significant for a ventral hernia and asthma, with psych h/o unspecified mood disorder (MDD vs substance-induced depressive disorder vs DMDD vs Bipolar I) and multiple substance related disorders (ccn, MJ, etoh, tobacco, sedatives, opioids), previous inpt rehab stays, prior psych admissions (last at [facility] 7/14-7/27/23), denies pSA/NSSIB, denies h/o aggression, +active crack/ccn use (reports having been incarcerated twice for theft related charges),followed by a psychiatrist at his shelter, on Wellbutrin 150mg, who self presents to the ED reporting depressive symptoms for the last 2 days."    Today Pt seen in his room laying in bed with ELENA Roblero. States that he is here for SI, and that he has not had is Wellbutrin in 5-6 days because he ran out and has not been able to see his psychiatrist to fill it. Pt would like to restart his Wellbutrin as it "keeps him level". He stated that he was on Lexipro once, but was too afraid of the sexual SE, so he stopped it. Pt states that the SI come and go, especially around the holidays because he is away from his son, but he has no plan and has never acted on his SI. States that his son is a reason to stay alive. Endorsed decreased appetite and wt loss leading up to his hospitalization, but feels as though it is coming back. Prior to this hospitalization, pt was going to the [facility] 4 days/week for 1:1 as well as group therapy, but states that it was too much and could not do that and hold a job. States that he would prefer therapy 2days/wk rather than 4. Pt is unsure where he will go after is 12mo are up at [employer] [facility] [facility]. He wants to try to get his life on track.   Pt states that he has a hernia that he was supposed to get repaired in the coming weeks. States that he has pre-op appts with cardiology and pulmonology.  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   Denies YADIRA HERRERA

## 2023-12-19 NOTE — BH INPATIENT PSYCHIATRY DISCHARGE NOTE - COLLABORATE WITH
FAXED [facility] ) 12/01/2020 TO THE [facility] PROVIDER INTEGRITY DEPARTMENT @ [facility] @ [phone] ACP

## 2023-12-19 NOTE — BH INPATIENT PSYCHIATRY DISCHARGE NOTE - NSBHDCBILLING_PSY_ALL_CORE
46008 (Hospital discharge day management; 30 min or less) [id number] (Hospital discharge day management; 30 min or less)

## 2023-12-19 NOTE — BH INPATIENT PSYCHIATRY DISCHARGE NOTE - NSDCMRMEDTOKEN_GEN_ALL_CORE_FT
buPROPion 150 mg/24 hours (XL) oral tablet, extended release: 1 tab(s) orally once a day  nicotine 21 mg/24 hr transdermal film, extended release: 1 patch transdermally once a day

## 2023-12-19 NOTE — BH INPATIENT PSYCHIATRY DISCHARGE NOTE - OTHER PAST PSYCHIATRIC HISTORY (INCLUDE DETAILS REGARDING ONSET, COURSE OF ILLNESS, INPATIENT/OUTPATIENT TREATMENT)
Hx depression, anxiety, SI. 2 Prior hospitalizations at [facility]. Takes Wellbutrin 150mg Hx depression, anxiety, SI. 2 Prior hospitalizations at [facility]. Takes Wellbutrin 150mg

## 2023-12-19 NOTE — BH DISCHARGE NOTE NURSING/SOCIAL WORK/PSYCH REHAB - NSCDUDCCRISIS_PSY_A_CORE
[facility] Well  1 (301) [facility]-WELL ([phone])  Text "WELL" to 34139  Website: www.my4oneone/.Safe [employer]izons 1 (432) 621-HOPE (4442) Website: www.safehorizon.org/.National Suicide Prevention Lifeline 7 (669) 749-9056/.  [employer]  1 (847) LIFENET (581-5588)/988 Suicide and Crisis Lifeline Critical access hospital Well  1 (991) Critical access hospital-WELL (818-7582)  Text "WELL" to 43180  Website: www.AVIS/.Safe Horizons 1 (749) 621-HOPE (5999) Website: www.safehorizon.org/.National Suicide Prevention Lifeline 7 (594) 666-8666/.  [employer]  1 (927) LIFENET (650-7998)/988 Suicide and Crisis Lifeline

## 2023-12-19 NOTE — BH INPATIENT PSYCHIATRY DISCHARGE NOTE - NSBHFUPINTERVALHXFT_PSY_A_CORE
Patient visible on the unit, anticipating discharge today. No si/hi/avh or PI. No acute medical concerns. Appropriate for discharge.

## 2023-12-19 NOTE — BH INPATIENT PSYCHIATRY DISCHARGE NOTE - DESCRIPTION
Pt is 49yo M, prior contractor, currently living and [employer] and working there as a [employer]

## 2023-12-19 NOTE — BH DISCHARGE NOTE NURSING/SOCIAL WORK/PSYCH REHAB - NSDCPRGOAL_PSY_ALL_CORE
Over the course of tx., pt. was social with peers; after a period of isolation, pt. attended groups of most modalities; pt. developed some insight, but remained generally superficial and guarded regarding depression and extent of drug use and its manageability; pt. did endorse valuing his NA sponsor, with whom he has a trusting, supportive relationship; pt. endorsed feelings of gratitude toward the inpatient experience at [facility], discharge, and the services that were provided for him; pt. was able to identify warnings, triggers, and coping strategies to manage future symptoms and stressors; pt. had a full range of affect; pt. also endorsed wanting to stay alive and sober for his 6 y/o son;    Over the course of tx., pt. was social with peers; after a period of isolation, pt. attended groups of most modalities; pt. developed some insight, but remained generally superficial and guarded regarding depression and extent of drug use and its manageability; pt. did endorse valuing his NA sponsor, with whom he has a trusting, supportive relationship; pt. endorsed feelings of gratitude toward the inpatient experience at [facility], discharge, and the services that were provided for him; pt. was able to identify warnings, triggers, and coping strategies to manage future symptoms and stressors; pt. had a full range of affect; pt. also endorsed wanting to stay alive and sober for his 6 y/o son;

## 2023-12-20 NOTE — BH SOCIAL WORK CONFIRMATION FOLLOW UP NOTE - NSCOMMENTS_PSY_ALL_CORE
CARING CONTACT [[id number]] @ [0564] Patient discharged on [12/19/2023]. Caring Call completed by ELOISE Shetty on [12/20/2023]. ELOISE unable to reach client; ELOISE left voicemail with callback number.  ---   CARING CONTACT [[id number]] @ [0987] Patient discharged on [12/19/2023]. Caring Call completed by ELOISE Shetty on [12/20/2023]. ELOISE unable to reach client; ELOISE left voicemail with callback number.  ---   CARING CONTACT [[id number]] @ [2691] Patient discharged on [12/19/2023]. Caring Call completed by ELOISE Shetty on [12/20/2023]. ELOISE unable to reach client; ELOISE left voicemail with callback number.  ---  LINKAGE CALL [[id number]] @ [4351] ELOISE e-mailed [[facility]] on [12/20/2023] to verify if patient attended appointment on [12/20/20203]. ELOISE awaiting response. CARING CONTACT [[id number]] @ [3673] Patient discharged on [12/19/2023]. Caring Call completed by ELOISE Shetty on [12/20/2023]. ELOISE unable to reach client; ELOISE left voicemail with callback number.  ---  LINKAGE CALL [[id number]] @ [1770] ELOISE e-mailed [[facility]] on [12/20/2023] to verify if patient attended appointment on [12/20/20203]. ELOISE awaiting response. CARING CONTACT [[id number]] @ [2352] Patient discharged on [12/19/2023]. Caring Call completed by ELOISE Shetty on [12/20/2023]. ELOISE unable to reach client; ELOISE left voicemail with callback number.  ---  LINKAGE CALL [[id number]] @ [1576] ELOISE e-mailed [[facility]] on [12/20/2023] to verify if patient attended appointment on [12/20/20203]. ELOISE Shetty received an update that pt linked to the clinic.  CARING CONTACT [[id number]] @ [6304] Patient discharged on [12/19/2023]. Caring Call completed by ELOISE Shetty on [12/20/2023]. ELOISE unable to reach client; ELOISE left voicemail with callback number.  ---  LINKAGE CALL [[id number]] @ [9300] ELOISE e-mailed [[facility]] on [12/20/2023] to verify if patient attended appointment on [12/20/20203]. ELOISE Shetty received an update that pt linked to the clinic.

## 2023-12-21 DIAGNOSIS — Z11.52 ENCOUNTER FOR SCREENING FOR COVID-19: ICD-10-CM

## 2023-12-21 DIAGNOSIS — Z56.0 UNEMPLOYMENT, UNSPECIFIED: ICD-10-CM

## 2023-12-21 DIAGNOSIS — T43.296A UNDERDOSING OF OTHER ANTIDEPRESSANTS, INITIAL ENCOUNTER: ICD-10-CM

## 2023-12-21 DIAGNOSIS — R45.851 SUICIDAL IDEATIONS: ICD-10-CM
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
## 2023-12-21 DIAGNOSIS — F14.10 COCAINE ABUSE, UNCOMPLICATED: ICD-10-CM

## 2023-12-21 DIAGNOSIS — F41.9 ANXIETY DISORDER, UNSPECIFIED: ICD-10-CM
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
## 2023-12-21 DIAGNOSIS — F19.94 OTHER PSYCHOACTIVE SUBSTANCE USE, UNSPECIFIED WITH PSYCHOACTIVE SUBSTANCE-INDUCED MOOD DISORDER: ICD-10-CM

## 2023-12-21 DIAGNOSIS — K43.9 VENTRAL HERNIA WITHOUT OBSTRUCTION OR GANGRENE: ICD-10-CM

## 2023-12-21 DIAGNOSIS — F32.A DEPRESSION, UNSPECIFIED: ICD-10-CM

## 2023-12-21 DIAGNOSIS — Z91.148 PATIENT'S OTHER NONCOMPLIANCE WITH MEDICATION REGIMEN FOR OTHER REASON: ICD-10-CM

## 2023-12-21 DIAGNOSIS — Z59.01 SHELTERED HOMELESSNESS: ICD-10-CM

## 2023-12-21 DIAGNOSIS — F17.210 NICOTINE DEPENDENCE, CIGARETTES, UNCOMPLICATED: ICD-10-CM

## 2023-12-21 SDOH — ECONOMIC STABILITY - INCOME SECURITY: UNEMPLOYMENT, UNSPECIFIED: Z56.0

## 2023-12-21 SDOH — ECONOMIC STABILITY - HOUSING INSECURITY: SHELTERED HOMELESSNESS: Z59.01

## 2024-03-27 NOTE — BH PSYCHOLOGY - CLINICIAN PSYCHOTHERAPY NOTE - NSBHPSYCHOLSERV_PSY_A_CORE
[facility]     Treatment Plan Acknowledgement    Lacy Velazco was involved in the treatment plan for this current admission, 3/26/2024.  Patient has seen and agrees to the Interdisciplinary Treatment Plan.  Lacy Velazco verbalizes that he/she will work with the treatment team to meet the Interdisciplinary Treatment Plan goals.    X____________________________________    Date/Time: _____________________    Patient/Legally Authorized Representative  X____________________________________    Date/Time: _____________________    Treatment Team Member           [id number]     Individual psychotherapy